# Patient Record
Sex: FEMALE | Race: BLACK OR AFRICAN AMERICAN | NOT HISPANIC OR LATINO | Employment: FULL TIME | URBAN - METROPOLITAN AREA
[De-identification: names, ages, dates, MRNs, and addresses within clinical notes are randomized per-mention and may not be internally consistent; named-entity substitution may affect disease eponyms.]

---

## 2017-04-12 ENCOUNTER — HISTORICAL (OUTPATIENT)
Dept: RADIOLOGY | Facility: HOSPITAL | Age: 52
End: 2017-04-12

## 2018-01-11 ENCOUNTER — HISTORICAL (OUTPATIENT)
Dept: RADIOLOGY | Facility: HOSPITAL | Age: 53
End: 2018-01-11

## 2018-01-11 LAB
ABS NEUT (OLG): 2.04 X10(3)/MCL (ref 2.1–9.2)
ALBUMIN SERPL-MCNC: 4 GM/DL (ref 3.4–5)
ALBUMIN/GLOB SERPL: 1.1 RATIO (ref 1.1–2)
ALP SERPL-CCNC: 130 UNIT/L (ref 46–116)
ALT SERPL-CCNC: 24 UNIT/L (ref 12–78)
APPEARANCE, UA: CLEAR
AST SERPL-CCNC: 11 UNIT/L (ref 15–37)
BACTERIA #/AREA URNS AUTO: ABNORMAL /HPF
BILIRUB SERPL-MCNC: 0.1 MG/DL (ref 0.2–1)
BILIRUB UR QL STRIP: NEGATIVE
BILIRUBIN DIRECT+TOT PNL SERPL-MCNC: 0.06 MG/DL (ref 0–0.2)
BILIRUBIN DIRECT+TOT PNL SERPL-MCNC: 0.08 MG/DL (ref 0–0.8)
BUN SERPL-MCNC: 7.9 MG/DL (ref 7–18)
CALCIUM SERPL-MCNC: 10.3 MG/DL (ref 8.5–10.1)
CHLORIDE SERPL-SCNC: 102 MMOL/L (ref 98–107)
CHOLEST SERPL-MCNC: 241 MG/DL (ref 0–200)
CHOLEST/HDLC SERPL: 4.4 {RATIO} (ref 0–4)
CO2 SERPL-SCNC: 29.4 MMOL/L (ref 21–32)
COLOR UR: YELLOW
CREAT SERPL-MCNC: 0.62 MG/DL (ref 0.6–1.3)
ERYTHROCYTE [DISTWIDTH] IN BLOOD BY AUTOMATED COUNT: 14.3 % (ref 11.5–17)
GLOBULIN SER-MCNC: 3.5 GM/DL (ref 2.4–3.5)
GLUCOSE (UA): NEGATIVE
GLUCOSE SERPL-MCNC: 93 MG/DL (ref 74–106)
HCT VFR BLD AUTO: 38.7 % (ref 37–47)
HDLC SERPL-MCNC: 55 MG/DL (ref 40–60)
HGB BLD-MCNC: 12.5 GM/DL (ref 12–16)
HGB UR QL STRIP: NEGATIVE
KETONES UR QL STRIP: NEGATIVE
LDLC SERPL CALC-MCNC: 165 MG/DL (ref 0–129)
LEUKOCYTE ESTERASE UR QL STRIP: NEGATIVE
MCH RBC QN AUTO: 27.6 PG (ref 27–31)
MCHC RBC AUTO-ENTMCNC: 32.3 GM/DL (ref 33–36)
MCV RBC AUTO: 85.4 FL (ref 80–94)
NITRITE UR QL STRIP.AUTO: NEGATIVE
PH UR STRIP: 6.5 [PH] (ref 5–9)
PLATELET # BLD AUTO: 306 X10(3)/MCL (ref 130–400)
PMV BLD AUTO: 11.9 FL (ref 9.4–12.4)
POTASSIUM SERPL-SCNC: 3.9 MMOL/L (ref 3.5–5.1)
PROT SERPL-MCNC: 7.5 GM/DL (ref 6.4–8.2)
PROT UR QL STRIP: NEGATIVE
RBC # BLD AUTO: 4.53 X10(6)/MCL (ref 4.2–5.4)
RBC #/AREA URNS HPF: ABNORMAL /[HPF]
SODIUM SERPL-SCNC: 138 MMOL/L (ref 136–145)
SP GR UR STRIP: 1.01 (ref 1–1.03)
SQUAMOUS EPITHELIAL, UA: ABNORMAL
TRIGL SERPL-MCNC: 103 MG/DL
TSH SERPL-ACNC: 0.37 MIU/ML (ref 0.36–3.74)
URATE SERPL-MCNC: 3.8 MG/DL (ref 2.6–7.2)
UROBILINOGEN UR STRIP-ACNC: 0.2
VLDLC SERPL CALC-MCNC: 21 MG/DL
WBC # SPEC AUTO: 5.1 X10(3)/MCL (ref 4.5–11.5)
WBC #/AREA URNS AUTO: ABNORMAL /[HPF]

## 2018-04-09 LAB
PAP RECOMMENDATION EXT: NORMAL
PAP SMEAR: NORMAL

## 2018-09-11 ENCOUNTER — HISTORICAL (OUTPATIENT)
Dept: LAB | Facility: HOSPITAL | Age: 53
End: 2018-09-11

## 2018-09-11 LAB
ALBUMIN SERPL-MCNC: 3.6 GM/DL (ref 3.4–5)
ALP SERPL-CCNC: 111 UNIT/L (ref 46–116)
ALT SERPL-CCNC: 15 UNIT/L (ref 12–78)
AST SERPL-CCNC: 13 UNIT/L (ref 15–37)
BILIRUB SERPL-MCNC: 0.2 MG/DL (ref 0.2–1)
BILIRUBIN DIRECT+TOT PNL SERPL-MCNC: 0.1 MG/DL (ref 0–0.2)
BILIRUBIN DIRECT+TOT PNL SERPL-MCNC: 0.1 MG/DL (ref 0–0.8)
CHOLEST SERPL-MCNC: 167 MG/DL (ref 0–200)
CHOLEST/HDLC SERPL: 2.7 {RATIO} (ref 0–4)
HDLC SERPL-MCNC: 61 MG/DL (ref 40–60)
LDLC SERPL CALC-MCNC: 90 MG/DL (ref 0–129)
PROT SERPL-MCNC: 6.9 GM/DL (ref 6.4–8.2)
TRIGL SERPL-MCNC: 81 MG/DL
VLDLC SERPL CALC-MCNC: 16 MG/DL

## 2019-01-25 ENCOUNTER — HISTORICAL (OUTPATIENT)
Dept: RADIOLOGY | Facility: HOSPITAL | Age: 54
End: 2019-01-25

## 2019-11-20 ENCOUNTER — HISTORICAL (OUTPATIENT)
Dept: LAB | Facility: HOSPITAL | Age: 54
End: 2019-11-20

## 2019-11-20 ENCOUNTER — HISTORICAL (OUTPATIENT)
Dept: RADIOLOGY | Facility: HOSPITAL | Age: 54
End: 2019-11-20

## 2019-11-20 LAB
ALBUMIN SERPL-MCNC: 3.5 GM/DL (ref 3.4–5)
ALBUMIN/GLOB SERPL: 0.8 RATIO (ref 1.1–2)
ALP SERPL-CCNC: 100 UNIT/L (ref 46–116)
ALT SERPL-CCNC: 13 UNIT/L (ref 12–78)
APPEARANCE, UA: CLEAR
AST SERPL-CCNC: 14 UNIT/L (ref 15–37)
BACTERIA SPEC CULT: NORMAL
BILIRUB SERPL-MCNC: 0.2 MG/DL (ref 0.2–1)
BILIRUB UR QL STRIP: NEGATIVE
BILIRUBIN DIRECT+TOT PNL SERPL-MCNC: 0.09 MG/DL (ref 0–0.8)
BILIRUBIN DIRECT+TOT PNL SERPL-MCNC: 0.11 MG/DL (ref 0–0.2)
BUN SERPL-MCNC: 4.1 MG/DL (ref 7–18)
CALCIUM SERPL-MCNC: 9.3 MG/DL (ref 8.5–10.1)
CHLORIDE SERPL-SCNC: 103 MMOL/L (ref 98–107)
CHOLEST SERPL-MCNC: 229 MG/DL (ref 0–200)
CHOLEST/HDLC SERPL: 2.9 {RATIO} (ref 0–4)
CO2 SERPL-SCNC: 23.5 MMOL/L (ref 21–32)
COLOR UR: YELLOW
CREAT SERPL-MCNC: 0.79 MG/DL (ref 0.6–1.3)
ERYTHROCYTE [DISTWIDTH] IN BLOOD BY AUTOMATED COUNT: 14.8 % (ref 11.5–17)
GLOBULIN SER-MCNC: 4.2 GM/DL (ref 2.4–3.5)
GLUCOSE (UA): NEGATIVE
GLUCOSE SERPL-MCNC: 88 MG/DL (ref 74–106)
HCT VFR BLD AUTO: 37.8 % (ref 37–47)
HDLC SERPL-MCNC: 79 MG/DL (ref 40–60)
HGB BLD-MCNC: 12.3 GM/DL (ref 12–16)
HGB UR QL STRIP: NORMAL
KETONES UR QL STRIP: NEGATIVE
LDLC SERPL CALC-MCNC: 130 MG/DL (ref 0–129)
LEUKOCYTE ESTERASE UR QL STRIP: NEGATIVE
MCH RBC QN AUTO: 28.5 PG (ref 27–31)
MCHC RBC AUTO-ENTMCNC: 32.5 GM/DL (ref 33–36)
MCV RBC AUTO: 87.5 FL (ref 80–94)
NITRITE UR QL STRIP: NEGATIVE
PH UR STRIP: 6 [PH] (ref 5–9)
PLATELET # BLD AUTO: 311 X10(3)/MCL (ref 130–400)
PMV BLD AUTO: 11.3 FL (ref 9.4–12.4)
POTASSIUM SERPL-SCNC: 3.6 MMOL/L (ref 3.5–5.1)
PROT SERPL-MCNC: 7.7 GM/DL (ref 6.4–8.2)
PROT UR QL STRIP: NEGATIVE
RBC # BLD AUTO: 4.32 X10(6)/MCL (ref 4.2–5.4)
RBC #/AREA URNS HPF: NORMAL /[HPF]
SODIUM SERPL-SCNC: 137 MMOL/L (ref 136–145)
SP GR UR STRIP: >=1.03 (ref 1–1.03)
SQUAMOUS EPITHELIAL, UA: NORMAL
TRIGL SERPL-MCNC: 98 MG/DL
TSH SERPL-ACNC: 0.81 MIU/ML (ref 0.36–3.74)
UROBILINOGEN UR STRIP-ACNC: 1
VLDLC SERPL CALC-MCNC: 20 MG/DL
WBC # SPEC AUTO: 4.3 X10(3)/MCL (ref 4.5–11.5)
WBC #/AREA URNS HPF: NORMAL /[HPF]

## 2021-01-07 ENCOUNTER — HISTORICAL (OUTPATIENT)
Dept: RADIOLOGY | Facility: HOSPITAL | Age: 56
End: 2021-01-07

## 2021-01-07 LAB
ALBUMIN SERPL-MCNC: 3.7 GM/DL (ref 3.5–5)
ALBUMIN/GLOB SERPL: 1.2 RATIO (ref 1.1–2)
ALP SERPL-CCNC: 95 UNIT/L (ref 40–150)
ALT SERPL-CCNC: 33 UNIT/L (ref 0–55)
APPEARANCE, UA: CLEAR
AST SERPL-CCNC: 45 UNIT/L (ref 5–34)
BACTERIA #/AREA URNS AUTO: NORMAL /HPF
BILIRUB SERPL-MCNC: 0.3 MG/DL
BILIRUB UR QL STRIP: NORMAL
BILIRUBIN DIRECT+TOT PNL SERPL-MCNC: 0.1 MG/DL (ref 0–0.5)
BILIRUBIN DIRECT+TOT PNL SERPL-MCNC: 0.2 MG/DL (ref 0–0.8)
BUN SERPL-MCNC: 7.2 MG/DL (ref 9.8–20.1)
CALCIUM SERPL-MCNC: 8.9 MG/DL (ref 8.4–10.2)
CHLORIDE SERPL-SCNC: 109 MMOL/L (ref 98–107)
CHOLEST SERPL-MCNC: 225 MG/DL
CHOLEST/HDLC SERPL: 4 {RATIO} (ref 0–5)
CO2 SERPL-SCNC: 22 MMOL/L (ref 22–29)
COLOR UR: YELLOW
CREAT SERPL-MCNC: 0.7 MG/DL (ref 0.55–1.02)
ERYTHROCYTE [DISTWIDTH] IN BLOOD BY AUTOMATED COUNT: 15.1 % (ref 11.5–17)
GLOBULIN SER-MCNC: 3.2 GM/DL (ref 2.4–3.5)
GLUCOSE (UA): NEGATIVE
GLUCOSE SERPL-MCNC: 89 MG/DL (ref 74–100)
HCT VFR BLD AUTO: 39.7 % (ref 37–47)
HDLC SERPL-MCNC: 59 MG/DL (ref 35–60)
HGB BLD-MCNC: 12.5 GM/DL (ref 12–16)
HGB UR QL STRIP: NEGATIVE
KETONES UR QL STRIP: NEGATIVE
LDLC SERPL CALC-MCNC: 141 MG/DL (ref 50–140)
LEUKOCYTE ESTERASE UR QL STRIP: NEGATIVE
MCH RBC QN AUTO: 27.7 PG (ref 27–31)
MCHC RBC AUTO-ENTMCNC: 31.5 GM/DL (ref 33–36)
MCV RBC AUTO: 88 FL (ref 80–94)
NITRITE UR QL STRIP.AUTO: NEGATIVE
PH UR STRIP: 6 [PH] (ref 5–9)
PLATELET # BLD AUTO: 336 X10(3)/MCL (ref 130–400)
PMV BLD AUTO: 11.9 FL (ref 9.4–12.4)
POTASSIUM SERPL-SCNC: 4.1 MMOL/L (ref 3.5–5.1)
PROT SERPL-MCNC: 6.9 GM/DL (ref 6.4–8.3)
PROT UR QL STRIP: 30
RBC # BLD AUTO: 4.51 X10(6)/MCL (ref 4.2–5.4)
RBC #/AREA URNS HPF: NORMAL /[HPF]
SODIUM SERPL-SCNC: 140 MMOL/L (ref 136–145)
SP GR UR STRIP: >=1.03 (ref 1–1.03)
SQUAMOUS EPITHELIAL, UA: NORMAL
TRIGL SERPL-MCNC: 126 MG/DL (ref 37–140)
TSH SERPL-ACNC: 1.02 UIU/ML (ref 0.35–4.94)
UROBILINOGEN UR STRIP-ACNC: 1
VLDLC SERPL CALC-MCNC: 25 MG/DL
WBC # SPEC AUTO: 4.8 X10(3)/MCL (ref 4.5–11.5)
WBC #/AREA URNS AUTO: NORMAL /HPF

## 2021-01-27 ENCOUNTER — HISTORICAL (OUTPATIENT)
Dept: RADIOLOGY | Facility: HOSPITAL | Age: 56
End: 2021-01-27

## 2021-06-01 ENCOUNTER — HISTORICAL (OUTPATIENT)
Dept: LAB | Facility: HOSPITAL | Age: 56
End: 2021-06-01

## 2021-06-01 LAB
ALBUMIN SERPL-MCNC: 3.5 GM/DL (ref 3.5–5)
ALBUMIN/GLOB SERPL: 0.9 RATIO (ref 1.1–2)
ALP SERPL-CCNC: 102 UNIT/L (ref 40–150)
ALT SERPL-CCNC: 24 UNIT/L (ref 0–55)
ANTINUCLEAR ANTIBODY SCREEN (OHS): NEGATIVE
APPEARANCE, UA: CLEAR
AST SERPL-CCNC: 43 UNIT/L (ref 5–34)
BACTERIA SPEC CULT: NORMAL
BILIRUB SERPL-MCNC: 0.2 MG/DL
BILIRUB UR QL STRIP: NEGATIVE
BILIRUBIN DIRECT+TOT PNL SERPL-MCNC: 0.1 MG/DL (ref 0–0.5)
BILIRUBIN DIRECT+TOT PNL SERPL-MCNC: 0.1 MG/DL (ref 0–0.8)
BUN SERPL-MCNC: 7.1 MG/DL (ref 9.8–20.1)
CALCIUM SERPL-MCNC: 9.3 MG/DL (ref 8.4–10.2)
CHLORIDE SERPL-SCNC: 108 MMOL/L (ref 98–107)
CO2 SERPL-SCNC: 24 MMOL/L (ref 22–29)
COLOR UR: YELLOW
CREAT SERPL-MCNC: 0.63 MG/DL (ref 0.55–1.02)
DSDNA ANTIBODY (OHS): NEGATIVE
ERYTHROCYTE [DISTWIDTH] IN BLOOD BY AUTOMATED COUNT: 16.8 % (ref 11.5–17)
ERYTHROCYTE [SEDIMENTATION RATE] IN BLOOD: 22 MM/HR (ref 0–20)
EST. AVERAGE GLUCOSE BLD GHB EST-MCNC: 108.3 MG/DL
GLOBULIN SER-MCNC: 3.7 GM/DL (ref 2.4–3.5)
GLUCOSE (UA): NEGATIVE
GLUCOSE SERPL-MCNC: 93 MG/DL (ref 74–100)
HBA1C MFR BLD: 5.4 %
HCT VFR BLD AUTO: 42.6 % (ref 37–47)
HGB BLD-MCNC: 13.2 GM/DL (ref 12–16)
HGB UR QL STRIP: NORMAL
KETONES UR QL STRIP: NEGATIVE
LEUKOCYTE ESTERASE UR QL STRIP: NEGATIVE
MCH RBC QN AUTO: 27.7 PG (ref 27–31)
MCHC RBC AUTO-ENTMCNC: 31 GM/DL (ref 33–36)
MCV RBC AUTO: 89.3 FL (ref 80–94)
NITRITE UR QL STRIP: NEGATIVE
PH UR STRIP: 6 [PH] (ref 5–9)
PLATELET # BLD AUTO: 272 X10(3)/MCL (ref 130–400)
PMV BLD AUTO: 11.4 FL (ref 9.4–12.4)
POTASSIUM SERPL-SCNC: 4.1 MMOL/L (ref 3.5–5.1)
PROT SERPL-MCNC: 7.2 GM/DL (ref 6.4–8.3)
PROT UR QL STRIP: NEGATIVE
RBC # BLD AUTO: 4.77 X10(6)/MCL (ref 4.2–5.4)
RBC #/AREA URNS HPF: NORMAL /HPF
SODIUM SERPL-SCNC: 143 MMOL/L (ref 136–145)
SP GR UR STRIP: 1.02 (ref 1–1.03)
SQUAMOUS EPITHELIAL, UA: NORMAL
UROBILINOGEN UR STRIP-ACNC: 0.2
VIT B12 SERPL-MCNC: 247 PG/ML (ref 213–816)
WBC # SPEC AUTO: 5.2 X10(3)/MCL (ref 4.5–11.5)
WBC #/AREA URNS HPF: NORMAL /[HPF]

## 2021-08-04 ENCOUNTER — HISTORICAL (OUTPATIENT)
Dept: LAB | Facility: HOSPITAL | Age: 56
End: 2021-08-04

## 2021-08-04 LAB
ALBUMIN % SPEP (OHS): 47.24 % (ref 48.1–59.5)
ALBUMIN SERPL-MCNC: 3.5 GM/DL (ref 3.5–5)
ALBUMIN/GLOB SERPL: 1 RATIO (ref 1.1–2)
ALPHA 1 GLOB (OHS): 0.3 GM/DL (ref 0–0.4)
ALPHA 1 GLOB% (OHS): 4.4 % (ref 2.3–4.9)
ALPHA 2 GLOB % (OHS): 11.51 % (ref 6.9–13)
ALPHA 2 GLOB (OHS): 0.79 GM/DL (ref 0.4–1)
BETA GLOB% (OHS): 18.13 % (ref 13.8–19.7)
GAMMA GLOBULIN % (OHS): 18.73 % (ref 10.1–21.9)
GAMMA GLOBULIN (OHS): 1.29 GM/DL (ref 0.4–1.8)
GLOBULIN SER-MCNC: 3.4 GM/DL (ref 2.4–3.5)
M SPIKE % (OHS): ABNORMAL
M SPIKE (OHS): ABNORMAL
PROT SERPL-MCNC: 6.9 GM/DL (ref 6.4–8.3)
PROT SERPL-MCNC: 7 GM/DL
SPE INTERPRETATION (OHS): ABNORMAL

## 2021-09-14 ENCOUNTER — HISTORICAL (OUTPATIENT)
Dept: ADMINISTRATIVE | Facility: HOSPITAL | Age: 56
End: 2021-09-14

## 2021-09-14 LAB
ABS NEUT (OLG): 3.54 X10(3)/MCL (ref 2.1–9.2)
ALBUMIN % SPEP (OHS): 50.09 % (ref 48.1–59.5)
ALBUMIN SERPL-MCNC: 3.8 GM/DL (ref 3.5–5)
ALBUMIN SERPL-MCNC: 3.9 GM/DL (ref 3.5–5)
ALBUMIN/GLOB SERPL: 1.1 RATIO (ref 1.1–2)
ALBUMIN/GLOB SERPL: 1.1 RATIO (ref 1.1–2)
ALP SERPL-CCNC: 120 UNIT/L (ref 40–150)
ALPHA 1 GLOB (OHS): 0.32 GM/DL (ref 0–0.4)
ALPHA 1 GLOB% (OHS): 4.38 % (ref 2.3–4.9)
ALPHA 2 GLOB % (OHS): 12.07 % (ref 6.9–13)
ALPHA 2 GLOB (OHS): 0.89 GM/DL (ref 0.4–1)
ALT SERPL-CCNC: 14 UNIT/L (ref 0–55)
ANTINUCLEAR ANTIBODY SCREEN (OHS): NEGATIVE
AST SERPL-CCNC: 17 UNIT/L (ref 5–34)
B2 MICROGLOB SERPL-MCNC: 2.18 MG/L
BASOPHILS # BLD AUTO: 0 X10(3)/MCL (ref 0–0.2)
BASOPHILS # BLD AUTO: 0 X10(3)/MCL (ref 0–0.2)
BASOPHILS NFR BLD AUTO: 0.3 %
BETA GLOB (OHS): 1.28 GM/DL (ref 0.7–1.3)
BETA GLOB% (OHS): 17.34 % (ref 13.8–19.7)
BILIRUB SERPL-MCNC: 0.2 MG/DL
BILIRUBIN DIRECT+TOT PNL SERPL-MCNC: 0.1 MG/DL (ref 0–0.5)
BILIRUBIN DIRECT+TOT PNL SERPL-MCNC: 0.1 MG/DL (ref 0–0.8)
BUN SERPL-MCNC: 8.9 MG/DL (ref 9.8–20.1)
CALCIUM SERPL-MCNC: 9.9 MG/DL (ref 8.4–10.2)
CENTROMERE PROTEIN ANTIBODY (OHS): NEGATIVE
CHLORIDE SERPL-SCNC: 100 MMOL/L (ref 98–107)
CO2 SERPL-SCNC: 29 MMOL/L (ref 22–29)
CREAT SERPL-MCNC: 0.79 MG/DL (ref 0.55–1.02)
DSDNA ANTIBODY (OHS): NEGATIVE
EOSINOPHIL # BLD AUTO: 0 X10(3)/MCL (ref 0–0.9)
EOSINOPHIL # BLD AUTO: 0.1 X10(3)/MCL (ref 0–0.9)
EOSINOPHIL NFR BLD AUTO: 0.9 %
ERYTHROCYTE [DISTWIDTH] IN BLOOD BY AUTOMATED COUNT: 14.5 % (ref 11.5–17)
FERRITIN SERPL-MCNC: 124.73 NG/ML (ref 4.63–204)
FOLATE SERPL-MCNC: 117.2 NG/ML (ref 7–31.4)
GAMMA GLOBULIN % (OHS): 16.12 % (ref 10.1–21.9)
GAMMA GLOBULIN (OHS): 1.19 GM/DL (ref 0.4–1.8)
GLOBULIN SER-MCNC: 3.5 GM/DL (ref 2.4–3.5)
GLOBULIN SER-MCNC: 3.6 GM/DL (ref 2.4–3.5)
GLUCOSE SERPL-MCNC: 84 MG/DL (ref 74–100)
GROUP & RH: NORMAL
HCT VFR BLD AUTO: 40.8 % (ref 37–47)
HGB BLD-MCNC: 12.9 GM/DL (ref 12–16)
IFE INTERPRETATION (OHS): ABNORMAL
IGA SERPL-MCNC: 109 MG/DL (ref 65–421)
IGA SERPL-MCNC: 109 MG/DL (ref 65–421)
IGG SERPL-MCNC: 1139 MG/DL (ref 552–1632)
IGG SERPL-MCNC: 1192 MG/DL (ref 552–1632)
IGM SERPL-MCNC: 34 MG/DL (ref 33–293)
IRON SATN MFR SERPL: 19 % (ref 20–50)
IRON SERPL-MCNC: 54 UG/DL (ref 50–170)
JO-1 ANTIBODY (OHS): NEGATIVE
LDH SERPL-CCNC: 207 UNIT/L (ref 140–271)
LYMPHOCYTES # BLD AUTO: 2.9 X10(3)/MCL (ref 0.6–4.6)
LYMPHOCYTES # BLD AUTO: 2.9 X10(3)/MCL (ref 0.6–4.6)
LYMPHOCYTES NFR BLD AUTO: 42.2 %
M SPIKE % (OHS): ABNORMAL
M SPIKE (OHS): ABNORMAL
MCH RBC QN AUTO: 28.4 PG (ref 27–31)
MCHC RBC AUTO-ENTMCNC: 31.6 GM/DL (ref 33–36)
MCV RBC AUTO: 89.9 FL (ref 80–94)
MONOCYTES # BLD AUTO: 0.4 X10(3)/MCL (ref 0.1–1.3)
MONOCYTES # BLD AUTO: 0.4 X10(3)/MCL (ref 0.1–1.3)
MONOCYTES NFR BLD AUTO: 5.1 %
NEUTROPHILS # BLD AUTO: 3.48 X10(3)/MCL (ref 2.1–9.2)
NEUTROPHILS # BLD AUTO: 3.5 X10(3)/MCL (ref 2.1–9.2)
NEUTROPHILS NFR BLD AUTO: 51.4 %
PEP GRAPH (OHS): ABNORMAL
PLATELET # BLD AUTO: 317 X10(3)/MCL (ref 130–400)
PMV BLD AUTO: 10.9 FL (ref 9.4–12.4)
POTASSIUM SERPL-SCNC: 4.2 MMOL/L (ref 3.5–5.1)
PROT SERPL-MCNC: 7.4 GM/DL (ref 6.4–8.3)
PROT SERPL-MCNC: 7.4 GM/DL (ref 6.4–8.3)
RBC # BLD AUTO: 4.54 X10(6)/MCL (ref 4.2–5.4)
RHEUMATOID FACT SERPL-ACNC: <13 IU/ML
RNP70 ANTIBODY (OHS): NEGATIVE
SCLERODERMA (SCL-70S) ANTIBODY (OHS): NEGATIVE
SMITH DP IGG (OHS): NEGATIVE
SODIUM SERPL-SCNC: 140 MMOL/L (ref 136–145)
SSA(RO) ANTIBODY (OHS): NEGATIVE
SSB(LA) ANTIBODY (OHS): NEGATIVE
TIBC SERPL-MCNC: 229 UG/DL (ref 70–310)
TIBC SERPL-MCNC: 283 UG/DL (ref 250–450)
TRANSFERRIN SERPL-MCNC: 259 MG/DL (ref 180–382)
TSH SERPL-ACNC: 0.57 UIU/ML (ref 0.35–4.94)
U1RNP ANTIBODY (OHS): NEGATIVE
VIT B12 SERPL-MCNC: 865 PG/ML (ref 213–816)
WBC # SPEC AUTO: 6.9 X10(3)/MCL (ref 4.5–11.5)

## 2021-09-16 ENCOUNTER — HISTORICAL (OUTPATIENT)
Dept: HEMATOLOGY/ONCOLOGY | Facility: CLINIC | Age: 56
End: 2021-09-16

## 2021-09-20 ENCOUNTER — HISTORICAL (OUTPATIENT)
Dept: RADIOLOGY | Facility: HOSPITAL | Age: 56
End: 2021-09-20

## 2021-10-04 ENCOUNTER — HISTORICAL (OUTPATIENT)
Dept: ADMINISTRATIVE | Facility: HOSPITAL | Age: 56
End: 2021-10-04

## 2022-04-01 ENCOUNTER — HISTORICAL (OUTPATIENT)
Dept: ADMINISTRATIVE | Facility: HOSPITAL | Age: 57
End: 2022-04-01

## 2022-04-01 ENCOUNTER — HISTORICAL (OUTPATIENT)
Dept: HEMATOLOGY/ONCOLOGY | Facility: CLINIC | Age: 57
End: 2022-04-01

## 2022-04-01 LAB
ABS NEUT (OLG): 2.88 (ref 2.1–9.2)
ALBUMIN SERPL-MCNC: 3.6 G/DL (ref 3.5–5)
ALBUMIN/GLOB SERPL: 1.1 {RATIO} (ref 1.1–2)
ALP SERPL-CCNC: 98 U/L (ref 40–150)
ALT SERPL-CCNC: 9 U/L (ref 0–55)
AST SERPL-CCNC: 12 U/L (ref 5–34)
BASOPHILS # BLD AUTO: 0 10*3/UL (ref 0–0.2)
BASOPHILS NFR BLD AUTO: 0.5 %
BILIRUB SERPL-MCNC: 0.2 MG/DL
BILIRUBIN DIRECT+TOT PNL SERPL-MCNC: <0.1 (ref 0–0.5)
BILIRUBIN DIRECT+TOT PNL SERPL-MCNC: >0.1 (ref 0–0.8)
BUN SERPL-MCNC: 7.3 MG/DL (ref 9.8–20.1)
CALCIUM SERPL-MCNC: 9.6 MG/DL (ref 8.7–10.5)
CHLORIDE SERPL-SCNC: 104 MMOL/L (ref 98–107)
CO2 SERPL-SCNC: 28 MMOL/L (ref 22–29)
CREAT SERPL-MCNC: 0.75 MG/DL (ref 0.55–1.02)
EOSINOPHIL # BLD AUTO: 0 10*3/UL (ref 0–0.9)
EOSINOPHIL NFR BLD AUTO: 0.8 %
ERYTHROCYTE [DISTWIDTH] IN BLOOD BY AUTOMATED COUNT: 15.6 % (ref 11.5–17)
GLOBULIN SER-MCNC: 3.3 G/DL (ref 2.4–3.5)
GLUCOSE SERPL-MCNC: 104 MG/DL (ref 74–100)
HCT VFR BLD AUTO: 39.5 % (ref 37–47)
HEMOLYSIS INTERF INDEX SERPL-ACNC: <0
HGB BLD-MCNC: 12.6 G/DL (ref 12–16)
ICTERIC INTERF INDEX SERPL-ACNC: 0
LIPEMIC INTERF INDEX SERPL-ACNC: <0
LYMPHOCYTES # BLD AUTO: 2.6 10*3/UL (ref 0.6–4.6)
LYMPHOCYTES NFR BLD AUTO: 42.7 %
MANUAL DIFF? (OHS): NO
MCH RBC QN AUTO: 27.5 PG (ref 27–31)
MCHC RBC AUTO-ENTMCNC: 31.9 G/DL (ref 33–36)
MCV RBC AUTO: 86.2 FL (ref 80–94)
MONOCYTES # BLD AUTO: 0.5 10*3/UL (ref 0.1–1.3)
MONOCYTES NFR BLD AUTO: 7.7 %
NEUTROPHILS # BLD AUTO: 2.9 10*3/UL (ref 2.1–9.2)
NEUTROPHILS NFR BLD AUTO: 48.3 %
PLATELET # BLD AUTO: 279 10*3/UL (ref 130–400)
PMV BLD AUTO: 11.5 FL (ref 9.4–12.4)
POTASSIUM SERPL-SCNC: 4 MMOL/L (ref 3.5–5.1)
PROT SERPL-MCNC: 6.9 G/DL (ref 6.4–8.3)
RBC # BLD AUTO: 4.58 10*6/UL (ref 4.2–5.4)
SODIUM SERPL-SCNC: 139 MMOL/L (ref 136–145)
WBC # SPEC AUTO: 6 10*3/UL (ref 4.5–11.5)

## 2022-04-10 ENCOUNTER — HISTORICAL (OUTPATIENT)
Dept: ADMINISTRATIVE | Facility: HOSPITAL | Age: 57
End: 2022-04-10
Payer: COMMERCIAL

## 2022-04-20 ENCOUNTER — HISTORICAL (OUTPATIENT)
Dept: RADIOLOGY | Facility: HOSPITAL | Age: 57
End: 2022-04-20
Payer: COMMERCIAL

## 2022-04-20 ENCOUNTER — HISTORICAL (OUTPATIENT)
Dept: ADMINISTRATIVE | Facility: HOSPITAL | Age: 57
End: 2022-04-20
Payer: COMMERCIAL

## 2022-04-26 VITALS — HEIGHT: 63 IN | BODY MASS INDEX: 26.64 KG/M2 | WEIGHT: 150.38 LBS

## 2022-05-04 RX ORDER — MELOXICAM 7.5 MG/1
1 TABLET ORAL DAILY
COMMUNITY
Start: 2021-09-14

## 2022-05-04 RX ORDER — MULTIVITAMIN/IRON/FOLIC ACID 18MG-0.4MG
1 TABLET ORAL DAILY
COMMUNITY
Start: 2021-09-14

## 2022-05-04 RX ORDER — DULOXETIN HYDROCHLORIDE 60 MG/1
1 CAPSULE, DELAYED RELEASE ORAL DAILY
COMMUNITY
Start: 2021-09-14

## 2022-05-04 RX ORDER — TRAMADOL HYDROCHLORIDE 50 MG/1
1 TABLET ORAL EVERY 6 HOURS
COMMUNITY
Start: 2021-09-14

## 2022-05-04 RX ORDER — GABAPENTIN 600 MG/1
1 TABLET ORAL 3 TIMES DAILY
COMMUNITY
Start: 2021-09-14

## 2022-05-05 ENCOUNTER — TELEPHONE (OUTPATIENT)
Dept: HEMATOLOGY/ONCOLOGY | Facility: CLINIC | Age: 57
End: 2022-05-05
Payer: COMMERCIAL

## 2022-05-05 NOTE — TELEPHONE ENCOUNTER
Patient had appt schedule for a Virtual Visit on 5/5/22 with Dr Royal.  Pt was unable to be reached on numbers on file.    Patient is a no show per DR Royal.

## 2022-05-11 ENCOUNTER — OFFICE VISIT (OUTPATIENT)
Dept: HEMATOLOGY/ONCOLOGY | Facility: CLINIC | Age: 57
End: 2022-05-11
Payer: COMMERCIAL

## 2022-05-11 DIAGNOSIS — D47.2 MGUS (MONOCLONAL GAMMOPATHY OF UNKNOWN SIGNIFICANCE): ICD-10-CM

## 2022-05-11 DIAGNOSIS — R22.1 MASS IN NECK: ICD-10-CM

## 2022-05-11 DIAGNOSIS — M25.512 LEFT SHOULDER PAIN, UNSPECIFIED CHRONICITY: Primary | ICD-10-CM

## 2022-05-11 PROCEDURE — 1159F PR MEDICATION LIST DOCUMENTED IN MEDICAL RECORD: ICD-10-PCS | Mod: CPTII,95,, | Performed by: INTERNAL MEDICINE

## 2022-05-11 PROCEDURE — 1160F RVW MEDS BY RX/DR IN RCRD: CPT | Mod: CPTII,95,, | Performed by: INTERNAL MEDICINE

## 2022-05-11 PROCEDURE — 99443 PR PHYSICIAN TELEPHONE EVALUATION 21-30 MIN: CPT | Mod: 95,,, | Performed by: INTERNAL MEDICINE

## 2022-05-11 PROCEDURE — 1160F PR REVIEW ALL MEDS BY PRESCRIBER/CLIN PHARMACIST DOCUMENTED: ICD-10-PCS | Mod: CPTII,95,, | Performed by: INTERNAL MEDICINE

## 2022-05-11 PROCEDURE — 99443 PR PHYSICIAN TELEPHONE EVALUATION 21-30 MIN: ICD-10-PCS | Mod: 95,,, | Performed by: INTERNAL MEDICINE

## 2022-05-11 PROCEDURE — 1159F MED LIST DOCD IN RCRD: CPT | Mod: CPTII,95,, | Performed by: INTERNAL MEDICINE

## 2022-05-11 NOTE — PROGRESS NOTES
"Established Patient - Audio Only Telehealth Visit     The patient location is: home  The chief complaint leading to consultation is: discuss results  Visit type: Virtual visit with audio only (telephone)  Total time spent with patient: 35 min       The reason for the audio only service rather than synchronous audio and video virtual visit was related to technical difficulties or patient preference/necessity.     Each patient to whom I provide medical services by telemedicine is:  (1) informed of the relationship between the physician and patient and the respective role of any other health care provider with respect to management of the patient; and (2) notified that they may decline to receive medical services by telemedicine and may withdraw from such care at any time. Patient verbally consented to receive this service via voice-only telephone call.    Initial Consultation: 9/14/2021  Referring Physician: Dr Grande  Other Physicians:  Code Status: Not addressed    Diagnosis/Problem list:   Monoclonal gammopathy       HPI:   56 years old female kindly referred by Dr. Grande for evaluation of gammopathy.  Patient reports that she has "bad neuropathy" since April, after her last Covid "shot". Since then the neuropathy has worsen.  Blood work showed a monoclonal gammopathy with an M spike 0.2 back in June 2021.     No family h/o of multiple myeloma, leukemia blood malignancies.  No exposed to any chemicals that she is aware of. She is disable due to ankle injury were she has some hardware. She has been smoking since 16 years old. Used to smoke 1 ppd but since April 5 cigarettes/day     Skeletal survey 9/20/2021:Numerous radiographs of the axial and appendicular skeleton are obtained as part of a skeletal survey. There is a 4 mm lucent lesion of the proximal left humeral metadiaphysis. Otherwise no suspicious osseous lesion is seen. Mild degenerative changes of the axial and appendicular skeleton.  Impression: Indeterminate " subcentimeter lucent lesion of the proximal left humerus. No other suspicious osseous lesion.  CT Head 10/4/2021:Moderate chronic microvascular ischemic changes in the brain. Mild left mastoid air cell fluid, which can be seen in the setting of mastoiditis. Otherwise unremarkable  CT C/A/P 10/4/2021: Unremarkable contrast enhanced CT of the thorax. No bone lesion appreciated.   Repeat skeletal survey on 4/1/22 with same unchanged left humerus lucid lesion.    She reports ongoing worsening  neuropathy in hands and feet. She has gained over 25 pounds since her last visit here in 9/2021. She says that she eats toast with jelly in the mornings and ice cream at night in addition to other high carbohydrate, high fat and high sodium meals.  She denies any fever, chills, or sweats. No chest pain or shortness of breath.     During last visit she has a small visible right neck mass (1.5 cm) firm, smooth, mobile. Says that she noticed it 2 months prior. US soft tissue neck to eval right cervical chain mass (LN? dermoid cyst?)  She was also complaining of left shoulder pain. MRI Left upper arm to eval lucent lesion on skeletal survey and shoulder pain. This were discussed with the patient in detail. Results as follows:     US Soft Tissue Head and Neck 4/20/2022  FINDINGS:  In the region of concern at the lateral right neck, there is an ovoid lesion measuring 1.1 x 1 x 0.6 cm. This lesion is probably anechoic with increased through transmission compatible with cyst. There is some layering debris. No internal color Doppler signal. IMPRESSION: Complicated cyst with internal layering debris at the area of palpable complaint.     MRI left Upper 4/20/2022  FINDINGS: An 8 mm x 8 mm x 8 mm enhancing lesion is present to the proximal humeral diaphysis and is not clearly associated with cortical thinning or periosteal reaction. A similar smaller lesion is present to the surgical neck. No discrete lesion seen to the distal humerus or the  included portion of the scapula.    The study is not optimized to evaluate the rotator cuff. An intermediate grade partial-thickness articular surface tear is present to the distal infraspinatus tendon. The labrum is not clearly visible.  No os acromiale. Coracoclavicular ligaments are intact. No atrophy of the rotator cuff muscles. No axillary lymphadenopathy. The neurovascular structures appear normal in course and caliber.  IMPRESSION: An 8 mm x 8 mm x 8 mm lesion is present to the proximal humeral diaphysis. A similar appearing smaller lesion is present to the surgical neck. These would be consistent with multiple myeloma under the appropriate clinical circumstances. Please correlate clinically as to the need for direct tissue acquisition. Intermediate grade partial-thickness articular surface tear to the distal infraspinatus tendon.    Assessment:  Monoclonal gammopathy-RESOLVED but small lucencies   Peripheral Neuropathy  Smoker since 16 years of age     Plan:   RTC in 8 weeks with MD with MM labs.     Refer to Ortho for further eval of left shoulder pain and Intermediate grade partial-thickness articular surface tear to the distal infraspinatus tendon. If need surgery will ask to do biopsy of suspicious lesions  Refer to ENT for further eval of Complicated cyst     Encouraged to call for any questions or problems.  The patient was given ample opportunity to ask questions and they were all answered to satisfaction; patient demonstrated understanding of what we discussed and is agreeable to the plan.      This service was not originating from a related E/M service provided within the previous 7 days nor will  to an E/M service or procedure within the next 24 hours or my soonest available appointment.  Prevailing standard of care was able to be met in this audio-only visit.

## 2022-05-31 DIAGNOSIS — Z12.31 ENCOUNTER FOR SCREENING MAMMOGRAM FOR MALIGNANT NEOPLASM OF BREAST: Primary | ICD-10-CM

## 2022-06-03 ENCOUNTER — HOSPITAL ENCOUNTER (OUTPATIENT)
Dept: RADIOLOGY | Facility: HOSPITAL | Age: 57
Discharge: HOME | End: 2022-06-03
Attending: FAMILY MEDICINE
Payer: COMMERCIAL

## 2022-06-03 DIAGNOSIS — Z12.31 ENCOUNTER FOR SCREENING MAMMOGRAM FOR MALIGNANT NEOPLASM OF BREAST: ICD-10-CM

## 2022-06-03 PROCEDURE — 77063 BREAST TOMOSYNTHESIS BI: CPT | Mod: 26,,, | Performed by: RADIOLOGY

## 2022-06-03 PROCEDURE — 77063 MAMMO DIGITAL SCREENING BILAT WITH TOMO: ICD-10-PCS | Mod: 26,,, | Performed by: RADIOLOGY

## 2022-06-03 PROCEDURE — 77067 SCR MAMMO BI INCL CAD: CPT | Mod: 26,,, | Performed by: RADIOLOGY

## 2022-06-03 PROCEDURE — 77067 SCR MAMMO BI INCL CAD: CPT | Mod: TC

## 2022-06-03 PROCEDURE — 77067 MAMMO DIGITAL SCREENING BILAT WITH TOMO: ICD-10-PCS | Mod: 26,,, | Performed by: RADIOLOGY

## 2022-06-14 ENCOUNTER — HOSPITAL ENCOUNTER (OUTPATIENT)
Dept: RADIOLOGY | Facility: CLINIC | Age: 57
Discharge: HOME | End: 2022-06-14
Attending: ORTHOPAEDIC SURGERY
Payer: COMMERCIAL

## 2022-06-14 ENCOUNTER — OFFICE VISIT (OUTPATIENT)
Dept: ORTHOPEDICS | Facility: CLINIC | Age: 57
End: 2022-06-14
Payer: COMMERCIAL

## 2022-06-14 VITALS — WEIGHT: 150 LBS | BODY MASS INDEX: 26.58 KG/M2 | HEIGHT: 63 IN

## 2022-06-14 DIAGNOSIS — M75.102 NONTRAUMATIC TEAR OF LEFT ROTATOR CUFF, UNSPECIFIED TEAR EXTENT: Primary | ICD-10-CM

## 2022-06-14 DIAGNOSIS — M25.512 LEFT SHOULDER PAIN, UNSPECIFIED CHRONICITY: ICD-10-CM

## 2022-06-14 DIAGNOSIS — M19.012 ARTHROSIS OF LEFT ACROMIOCLAVICULAR JOINT: ICD-10-CM

## 2022-06-14 PROCEDURE — 3008F PR BODY MASS INDEX (BMI) DOCUMENTED: ICD-10-PCS | Mod: CPTII,,, | Performed by: ORTHOPAEDIC SURGERY

## 2022-06-14 PROCEDURE — 1159F MED LIST DOCD IN RCRD: CPT | Mod: CPTII,,, | Performed by: ORTHOPAEDIC SURGERY

## 2022-06-14 PROCEDURE — 1160F RVW MEDS BY RX/DR IN RCRD: CPT | Mod: CPTII,,, | Performed by: ORTHOPAEDIC SURGERY

## 2022-06-14 PROCEDURE — 99204 PR OFFICE/OUTPT VISIT, NEW, LEVL IV, 45-59 MIN: ICD-10-PCS | Mod: ,,, | Performed by: ORTHOPAEDIC SURGERY

## 2022-06-14 PROCEDURE — 1160F PR REVIEW ALL MEDS BY PRESCRIBER/CLIN PHARMACIST DOCUMENTED: ICD-10-PCS | Mod: CPTII,,, | Performed by: ORTHOPAEDIC SURGERY

## 2022-06-14 PROCEDURE — 99204 OFFICE O/P NEW MOD 45 MIN: CPT | Mod: ,,, | Performed by: ORTHOPAEDIC SURGERY

## 2022-06-14 PROCEDURE — 73030 XR SHOULDER COMPLETE 2 OR MORE VIEWS LEFT: ICD-10-PCS | Mod: LT,,, | Performed by: ORTHOPAEDIC SURGERY

## 2022-06-14 PROCEDURE — 1159F PR MEDICATION LIST DOCUMENTED IN MEDICAL RECORD: ICD-10-PCS | Mod: CPTII,,, | Performed by: ORTHOPAEDIC SURGERY

## 2022-06-14 PROCEDURE — 73030 X-RAY EXAM OF SHOULDER: CPT | Mod: LT,,, | Performed by: ORTHOPAEDIC SURGERY

## 2022-06-14 PROCEDURE — 3008F BODY MASS INDEX DOCD: CPT | Mod: CPTII,,, | Performed by: ORTHOPAEDIC SURGERY

## 2022-06-14 RX ORDER — PREDNISONE 20 MG/1
20 TABLET ORAL DAILY
Qty: 10 TABLET | Refills: 0 | Status: SHIPPED | OUTPATIENT
Start: 2022-06-14 | End: 2022-06-24

## 2022-06-14 NOTE — PROGRESS NOTES
Chief Complaint:   Chief Complaint   Patient presents with    Left Shoulder - Pain    Pain     Left shoulder pain, patient was seeing  was told she has a tear had XR and MRI done, no prior injury or sx     Consulting Physician: Traci Royal MD      History of present illness:    This is a 56 y.o. year old female presenting with left shoulder pain for several months. This shoulder pain is moderate to severe. Patient reports increased pain with overhead movement. Patient reports night pain. Patient reports anterolateral shoulder pain that radiates down the arm. Patient had no previous treatment. She was evaluated by her hematologist/oncologist Traci Royal MD who ordered an MRI of her left upper extremity. MRI of left upper extremity demonstrated an 8 mm x 8 mm x 8 mm lesion is present to the proximal humeral diaphysis, with a similar appearing smaller lesion is present to the surgical neck, as well as an intermediate grade partial-thickness articular surface tear to the distal infraspinatus tendon.      Past Medical History:   Diagnosis Date    HLD (hyperlipidemia)     HTN (hypertension)     Peripheral neuropathy        Past Surgical History:   Procedure Laterality Date    ANKLE FUSION Left     COLONOSCOPY  01/2019       Current Outpatient Medications   Medication Sig    DULoxetine (CYMBALTA) 60 MG capsule Take 1 capsule by mouth once daily.    gabapentin (NEURONTIN) 600 MG tablet Take 1 tablet by mouth 3 (three) times daily.    meloxicam (MOBIC) 7.5 MG tablet Take 1 tablet by mouth once daily.    traMADoL (ULTRAM) 50 mg tablet Take 1 tablet by mouth every 6 (six) hours.    vitamin B complex-folic acid 0.4 mg Tab Take 1 tablet by mouth once daily.    predniSONE (DELTASONE) 20 MG tablet Take 1 tablet (20 mg total) by mouth once daily. for 10 days     No current facility-administered medications for this visit.       Review of patient's allergies indicates:  No Known  "Allergies    Family History   Problem Relation Age of Onset    Dementia Father     Diabetes Brother     Stroke Paternal Grandmother     Heart attack Paternal Grandmother        Social History     Socioeconomic History    Marital status:    Tobacco Use    Smoking status: Current Every Day Smoker     Packs/day: 0.50    Smokeless tobacco: Never Used   Substance and Sexual Activity    Alcohol use: Never    Drug use: Never           Review of Systems:  All review of systems negative except for those stated in the HPI.    Examination:    Vital Signs:    Vitals:    06/14/22 1058   Weight: 68 kg (150 lb)   Height: 5' 3" (1.6 m)       Body mass index is 26.57 kg/m².    Physical Exam:   General: Well-developed, well-nourished.  Neuro: Alert and oriented x 3.  Psych: Normal mood and affect.  Left Shoulder Exam:  No obvious deformity. No medial or lateral scapula winging. Forward flexion to 90 degrees and abduction to 90 degrees. Positive empty can, positive Whipple, Positive drop arm test, Kessler, impingement, Positive AC joint tenderness. Negative biceps groove tenderness, Positive Jane´s, Yergason´s, Speed test. Negative Apprehension and Relocation test. 4/5 strength, normal skin appearance and palpable pulses distally. Sensibility normal.      Imaging: X-rays ordered and images interpreted today personally by me of the left shoulder demonstrate moderate to severe acromioclavicular joint arthrosis.      Assessment: Nontraumatic tear of left rotator cuff, unspecified tear extent  -     Ambulatory referral/consult to Physical/Occupational Therapy; Future; Expected date: 06/21/2022  -     predniSONE (DELTASONE) 20 MG tablet; Take 1 tablet (20 mg total) by mouth once daily. for 10 days  Dispense: 10 tablet; Refill: 0    Arthrosis of left acromioclavicular joint  -     Ambulatory referral/consult to Physical/Occupational Therapy; Future; Expected date: 06/21/2022  -     predniSONE (DELTASONE) 20 MG tablet; Take " 1 tablet (20 mg total) by mouth once daily. for 10 days  Dispense: 10 tablet; Refill: 0    Left shoulder pain, unspecified chronicity  -     Ambulatory referral/consult to Orthopedics  -     X-Ray Shoulder 2 or More Views Left; Future; Expected date: 06/14/2022        Plan:  X-rays taken today and prior MRI results were reviewed with the patient.  Given that she has had no prior treatment, we discussed multiple conservative options, including medications, subacromial corticosteroid injection, and physical therapy.  Referral entered for formal physical therapy twice weekly.  Prescription routed for prednisone 20 mg to be taken daily for 10 days.  She will return to clinic in approximately 6 weeks for re-evaluation.  At that time, she has not had significant improvement in her symptoms we may consider obtaining an MRI of her left shoulder.  This would provide more detailed images of the rotator cuff tendons.  She verbalized understanding the plan of care with no further questions.           Follow up in about 6 weeks (around 7/26/2022) for Reevaluation.      DISCLAIMER: This note may have been dictated using voice recognition software and may contain grammatical errors.     NOTE: Consult report sent to referring provider via [employer].

## 2022-07-06 ENCOUNTER — LAB VISIT (OUTPATIENT)
Dept: LAB | Facility: HOSPITAL | Age: 57
End: 2022-07-06
Attending: INTERNAL MEDICINE
Payer: COMMERCIAL

## 2022-07-06 DIAGNOSIS — D47.2 MGUS (MONOCLONAL GAMMOPATHY OF UNKNOWN SIGNIFICANCE): ICD-10-CM

## 2022-07-06 DIAGNOSIS — D47.2 MGUS (MONOCLONAL GAMMOPATHY OF UNKNOWN SIGNIFICANCE): Primary | ICD-10-CM

## 2022-07-06 LAB
ALBUMIN SERPL-MCNC: 3.7 GM/DL (ref 3.5–5)
ALBUMIN/GLOB SERPL: 1.2 RATIO (ref 1.1–2)
ALP SERPL-CCNC: 164 UNIT/L (ref 40–150)
ALT SERPL-CCNC: 14 UNIT/L (ref 0–55)
AST SERPL-CCNC: 13 UNIT/L (ref 5–34)
BASOPHILS # BLD AUTO: 0.02 X10(3)/MCL (ref 0–0.2)
BASOPHILS NFR BLD AUTO: 0.3 %
BILIRUBIN DIRECT+TOT PNL SERPL-MCNC: 0.2 MG/DL
BUN SERPL-MCNC: 5.7 MG/DL (ref 9.8–20.1)
CALCIUM SERPL-MCNC: 9.6 MG/DL (ref 8.4–10.2)
CHLORIDE SERPL-SCNC: 106 MMOL/L (ref 98–107)
CO2 SERPL-SCNC: 27 MMOL/L (ref 22–29)
CREAT SERPL-MCNC: 0.69 MG/DL (ref 0.55–1.02)
EOSINOPHIL # BLD AUTO: 0.08 X10(3)/MCL (ref 0–0.9)
EOSINOPHIL NFR BLD AUTO: 1.1 %
ERYTHROCYTE [DISTWIDTH] IN BLOOD BY AUTOMATED COUNT: 14.8 % (ref 11.5–17)
GLOBULIN SER-MCNC: 3.1 GM/DL (ref 2.4–3.5)
GLUCOSE SERPL-MCNC: 56 MG/DL (ref 74–100)
HCT VFR BLD AUTO: 39.9 % (ref 37–47)
HGB BLD-MCNC: 12.8 GM/DL (ref 12–16)
IGA SERPL-MCNC: 123 MG/DL (ref 65–421)
IGG SERPL-MCNC: 1055 MG/DL (ref 522–1631)
IGM SERPL-MCNC: 30 MG/DL (ref 33–293)
IMM GRANULOCYTES # BLD AUTO: 0.01 X10(3)/MCL (ref 0–0.04)
IMM GRANULOCYTES NFR BLD AUTO: 0.1 %
LYMPHOCYTES # BLD AUTO: 2.65 X10(3)/MCL (ref 0.6–4.6)
LYMPHOCYTES NFR BLD AUTO: 34.9 %
MCH RBC QN AUTO: 27.3 PG (ref 27–31)
MCHC RBC AUTO-ENTMCNC: 32.1 MG/DL (ref 33–36)
MCV RBC AUTO: 85.1 FL (ref 80–94)
MONOCYTES # BLD AUTO: 0.58 X10(3)/MCL (ref 0.1–1.3)
MONOCYTES NFR BLD AUTO: 7.6 %
NEUTROPHILS # BLD AUTO: 4.3 X10(3)/MCL (ref 2.1–9.2)
NEUTROPHILS NFR BLD AUTO: 56 %
PLATELET # BLD AUTO: 331 X10(3)/MCL (ref 130–400)
PMV BLD AUTO: 10.7 FL (ref 7.4–10.4)
POTASSIUM SERPL-SCNC: 4.5 MMOL/L (ref 3.5–5.1)
PROT SERPL-MCNC: 6.8 GM/DL (ref 6.4–8.3)
RBC # BLD AUTO: 4.69 X10(6)/MCL (ref 4.2–5.4)
SODIUM SERPL-SCNC: 140 MMOL/L (ref 136–145)
WBC # SPEC AUTO: 7.6 X10(3)/MCL (ref 4.5–11.5)

## 2022-07-06 PROCEDURE — 85025 COMPLETE CBC W/AUTO DIFF WBC: CPT

## 2022-07-06 PROCEDURE — 80053 COMPREHEN METABOLIC PANEL: CPT

## 2022-07-06 PROCEDURE — 82784 ASSAY IGA/IGD/IGG/IGM EACH: CPT

## 2022-07-06 PROCEDURE — 36415 COLL VENOUS BLD VENIPUNCTURE: CPT

## 2022-07-06 PROCEDURE — 84165 PROTEIN E-PHORESIS SERUM: CPT

## 2022-07-07 LAB
KAPPA LC FREE SER-MCNC: 1.88 MG/DL (ref 0.33–1.94)
KAPPA LC FREE/LAMBDA FREE SER: 1.4 {RATIO} (ref 0.26–1.65)
LAMBDA LC FREE SERPL-MCNC: 1.34 MG/DL (ref 0.57–2.63)

## 2022-07-11 ENCOUNTER — OFFICE VISIT (OUTPATIENT)
Dept: HEMATOLOGY/ONCOLOGY | Facility: CLINIC | Age: 57
End: 2022-07-11
Payer: COMMERCIAL

## 2022-07-11 VITALS
HEART RATE: 92 BPM | HEIGHT: 63 IN | SYSTOLIC BLOOD PRESSURE: 138 MMHG | DIASTOLIC BLOOD PRESSURE: 81 MMHG | BODY MASS INDEX: 33.49 KG/M2 | WEIGHT: 189 LBS | OXYGEN SATURATION: 93 % | TEMPERATURE: 99 F

## 2022-07-11 DIAGNOSIS — D47.2 MGUS (MONOCLONAL GAMMOPATHY OF UNKNOWN SIGNIFICANCE): Primary | ICD-10-CM

## 2022-07-11 PROCEDURE — 3008F PR BODY MASS INDEX (BMI) DOCUMENTED: ICD-10-PCS | Mod: CPTII,S$GLB,, | Performed by: INTERNAL MEDICINE

## 2022-07-11 PROCEDURE — 3079F PR MOST RECENT DIASTOLIC BLOOD PRESSURE 80-89 MM HG: ICD-10-PCS | Mod: CPTII,S$GLB,, | Performed by: INTERNAL MEDICINE

## 2022-07-11 PROCEDURE — 3008F BODY MASS INDEX DOCD: CPT | Mod: CPTII,S$GLB,, | Performed by: INTERNAL MEDICINE

## 2022-07-11 PROCEDURE — 99214 PR OFFICE/OUTPT VISIT, EST, LEVL IV, 30-39 MIN: ICD-10-PCS | Mod: S$GLB,,, | Performed by: INTERNAL MEDICINE

## 2022-07-11 PROCEDURE — 1160F PR REVIEW ALL MEDS BY PRESCRIBER/CLIN PHARMACIST DOCUMENTED: ICD-10-PCS | Mod: CPTII,S$GLB,, | Performed by: INTERNAL MEDICINE

## 2022-07-11 PROCEDURE — 99999 PR PBB SHADOW E&M-EST. PATIENT-LVL IV: ICD-10-PCS | Mod: PBBFAC,,, | Performed by: INTERNAL MEDICINE

## 2022-07-11 PROCEDURE — 1160F RVW MEDS BY RX/DR IN RCRD: CPT | Mod: CPTII,S$GLB,, | Performed by: INTERNAL MEDICINE

## 2022-07-11 PROCEDURE — 99999 PR PBB SHADOW E&M-EST. PATIENT-LVL IV: CPT | Mod: PBBFAC,,, | Performed by: INTERNAL MEDICINE

## 2022-07-11 PROCEDURE — 3075F SYST BP GE 130 - 139MM HG: CPT | Mod: CPTII,S$GLB,, | Performed by: INTERNAL MEDICINE

## 2022-07-11 PROCEDURE — 3079F DIAST BP 80-89 MM HG: CPT | Mod: CPTII,S$GLB,, | Performed by: INTERNAL MEDICINE

## 2022-07-11 PROCEDURE — 99214 OFFICE O/P EST MOD 30 MIN: CPT | Mod: S$GLB,,, | Performed by: INTERNAL MEDICINE

## 2022-07-11 PROCEDURE — 3075F PR MOST RECENT SYSTOLIC BLOOD PRESS GE 130-139MM HG: ICD-10-PCS | Mod: CPTII,S$GLB,, | Performed by: INTERNAL MEDICINE

## 2022-07-11 PROCEDURE — 1159F PR MEDICATION LIST DOCUMENTED IN MEDICAL RECORD: ICD-10-PCS | Mod: CPTII,S$GLB,, | Performed by: INTERNAL MEDICINE

## 2022-07-11 PROCEDURE — 1159F MED LIST DOCD IN RCRD: CPT | Mod: CPTII,S$GLB,, | Performed by: INTERNAL MEDICINE

## 2022-07-11 RX ORDER — ESTRADIOL 2 MG/1
1 TABLET ORAL DAILY
COMMUNITY
Start: 2022-05-14

## 2022-07-11 RX ORDER — AMLODIPINE BESYLATE 5 MG/1
TABLET ORAL
COMMUNITY
Start: 2022-05-14

## 2022-07-11 RX ORDER — ATORVASTATIN CALCIUM 40 MG/1
40 TABLET, FILM COATED ORAL DAILY
COMMUNITY
Start: 2022-06-28

## 2022-07-11 RX ORDER — TRAZODONE HYDROCHLORIDE 100 MG/1
100 TABLET ORAL NIGHTLY
COMMUNITY
Start: 2022-07-09

## 2022-07-11 NOTE — PROGRESS NOTES
HEMATOLOGY/ONCOLOGY OFFICE CLINIC VISIT    Visit Information:    Initial Consultation: 9/14/2021  Referring Physician: Dr Grande  Other Physicians:  Code Status: Not addressed    Diagnosis/Problem list:   Monoclonal gammopathy      Present treatment:  Observation    Treatment/Oncology history:    Plan of care:     Imaging:  Skeletal survey 9/20/2021: There is a 4 mm lucent lesion of the proximal left humeral metadiaphysis. Indeterminate subcentimeter lucent lesion of the proximal left humerus. No other suspicious osseous lesion.   CT Head 10/4/2021: Moderate chronic microvascular ischemic changes in the brain. Mild left mastoid air cell fluid, can be seen in the setting of mastoiditis.  CT C/A/P 10/4/2021: Unremarkable contrast enhanced CT of the thorax. No bone lesion appreciated.   skeletal survey on 4/1/22: same unchanged left humerus lucid lesion.   US Soft Tissue Head and Neck 4/20/2022  In the region of concern at the lateral right neck, there is an ovoid lesion measuring 1.1 x 1 x 0.6 cm. This lesion is probably anechoic with increased through transmission compatible with cyst. IMPRESSION: Complicated cyst with internal layering debris at the area of palpable complaint.   MRI left Upper 4/20/2022 FINDINGS: An 8 mm x 8 mm x 8 mm enhancing lesion is present to the proximal humeral diaphysis and is not clearly associated with cortical thinning or periosteal reaction. A similar smaller lesion is present to the surgical neck. No discrete lesion seen to the distal humerus or the included portion of the scapula.  The study is not optimized to evaluate the rotator cuff. An intermediate grade partial-thickness articular surface tear is present to the distal infraspinatus tendon. The labrum is not clearly visible. No os acromiale. Coracoclavicular ligaments are intact. No atrophy of the rotator cuff muscles. No axillary lymphadenopathy. The neurovascular structures appear normal in course and caliber.  IMPRESSION: An 8  "mm x 8 mm x 8 mm lesion is present to the proximal humeral diaphysis. A similar appearing smaller lesion is present to the surgical neck. These would be consistent with multiple myeloma under the appropriate clinical circumstances. Please correlate clinically as to the need for direct tissue acquisition. Intermediate grade partial-thickness articular surface tear to the distal infraspinatus tendon.    Pathology:      CLINICAL HISTORY:       Patient: Roxanne Lima is a 56 y.o. female kindly referred by Dr. Grande for evaluation of gammopathy.  Patient reports that she has "bad neuropathy" since April, after her last Covid "shot". Since then the neuropathy has worsen.  Blood work showed a monoclonal gammopathy with an M spike 0.2 back in June 2021.     No family h/o of multiple myeloma, leukemia blood malignancies.  No exposed to any chemicals that she is aware of. She is disable due to ankle injury were she has some hardware. She has been smoking since 16 years old. Used to smoke 1 ppd but since April 5 cigarettes/day     Skeletal survey 9/20/2021  Findings: Numerous radiographs of the axial and appendicular skeleton are obtained as part of a skeletal survey. There is a 4 mm lucent lesion of the proximal left humeral metadiaphysis. Otherwise no suspicious osseous lesion is seen. Mild degenerative changes of the axial and appendicular skeleton.  Impression: Indeterminate subcentimeter lucent lesion of the proximal left humerus. No other suspicious osseous lesion.     Chief Complaint: No concerns today      Interval History:   Last visit: She reports ongoing worsening  neuropathy in hands and feet. She has gained over 25 pounds since her last visit here in 9/2021. She says that she eats toast with jelly in the mornings and ice cream at night in addition to other high carbohydrate, high fat and high sodium meals.  She denies any fever, chills, or sweats. No chest pain or shortness of breath.      During last visit she " "has a small visible right neck mass (1.5 cm) firm, smooth, mobile. Says that she noticed it 2 months prior. US soft tissue neck to eval right cervical chain mass (LN? dermoid cyst?)  She was also complaining of left shoulder pain. MRI Left upper arm to eval lucent lesion on skeletal survey and shoulder pain. This were discussed with the patient in detail.     She was Refer to Ortho for further eval of left shoulder pain and Intermediate grade partial-thickness articular surface tear to the distal infraspinatus tendon. If need surgery will ask to do biopsy of suspicious lesions and Refer to ENT for further eval of Complicated cyst    7/11/2022: Patient is here today in follow-up.  Since our last visit she was seen by EVANGELIST Godwin and "Given that she has had no prior treatment, we discussed multiple conservative options, including medications, subacromial corticosteroid injection, and physical therapy."  " Referral entered for formal physical therapy twice weekly.  Prescription routed for prednisone 20 mg to be taken daily for 10 days.  She will return to clinic in approximately 6 weeks for re-evaluation.  At that time, she has not had significant improvement in her symptoms we may consider obtaining an MRI of her left shoulder.  This would provide more detailed images of the rotator cuff tendons."       She is on PT. She did not see ENT. Will be sure that she was referred.     Past Medical History:   Diagnosis Date    HLD (hyperlipidemia)     HTN (hypertension)     Peripheral neuropathy       Past Surgical History:   Procedure Laterality Date    ANKLE FUSION Left     COLONOSCOPY  01/2019     Family History   Problem Relation Age of Onset    Dementia Father     Diabetes Brother     Stroke Paternal Grandmother     Heart attack Paternal Grandmother      Social Connections: Not on file       Review of patient's allergies indicates:  No Known Allergies   Current Outpatient Medications on File Prior to Visit "   Medication Sig Dispense Refill    amLODIPine (NORVASC) 5 MG tablet       atorvastatin (LIPITOR) 40 MG tablet Take 40 mg by mouth once daily.      DULoxetine (CYMBALTA) 60 MG capsule Take 1 capsule by mouth once daily.      estradioL (ESTRACE) 2 MG tablet 1 mg once daily.      gabapentin (NEURONTIN) 600 MG tablet Take 1 tablet by mouth 3 (three) times daily.      meloxicam (MOBIC) 7.5 MG tablet Take 1 tablet by mouth once daily.      traMADoL (ULTRAM) 50 mg tablet Take 1 tablet by mouth every 6 (six) hours.      vitamin B complex-folic acid 0.4 mg Tab Take 1 tablet by mouth once daily.      traZODone (DESYREL) 100 MG tablet Take 100 mg by mouth nightly.       No current facility-administered medications on file prior to visit.      Review of Systems   Constitutional: Positive for unexpected weight change. Negative for activity change, appetite change, chills, fatigue and fever.   HENT: Negative for mouth dryness, mouth sores, nosebleeds, sore throat and trouble swallowing.    Eyes: Negative for visual disturbance.   Respiratory: Negative for cough and shortness of breath.    Cardiovascular: Negative for chest pain, palpitations and leg swelling.   Gastrointestinal: Negative for abdominal distention, abdominal pain, blood in stool, change in bowel habit, constipation, diarrhea, nausea, vomiting and change in bowel habit.   Endocrine: Negative.    Genitourinary: Negative for dysuria, frequency, hematuria and urgency.   Musculoskeletal: Positive for back pain. Negative for arthralgias, myalgias and neck pain.   Integumentary:  Negative for rash, breast mass, breast discharge and breast tenderness.   Neurological: Negative for dizziness, tremors, syncope, speech difficulty, weakness, light-headedness, numbness, headaches and memory loss.   Hematological: Negative for adenopathy. Does not bruise/bleed easily.   Psychiatric/Behavioral: Negative for confusion and suicidal ideas. The patient is nervous/anxious.   "  Breast: Negative for mass and tenderness             Vitals:    07/11/22 1454   BP: 138/81   BP Location: Left arm   Patient Position: Sitting   Pulse: 92   Temp: 98.5 °F (36.9 °C)   SpO2: (!) 93%   Weight: 85.7 kg (189 lb)   Height: 5' 3" (1.6 m)      Physical Exam  Vitals and nursing note reviewed.   Constitutional:       General: She is not in acute distress.     Appearance: Normal appearance. She is well-developed.   HENT:      Head: Normocephalic and atraumatic.      Mouth/Throat:      Mouth: Mucous membranes are moist.   Eyes:      General: No scleral icterus.     Extraocular Movements: Extraocular movements intact.      Conjunctiva/sclera: Conjunctivae normal.      Pupils: Pupils are equal, round, and reactive to light.   Neck:      Vascular: No JVD.   Cardiovascular:      Rate and Rhythm: Normal rate and regular rhythm.      Heart sounds: No murmur heard.  Pulmonary:      Effort: Pulmonary effort is normal.      Breath sounds: Normal breath sounds. No wheezing or rhonchi.   Chest:      Chest wall: No deformity or tenderness.   Breasts:      Right: No swelling, mass, nipple discharge, skin change, tenderness, axillary adenopathy or supraclavicular adenopathy.      Left: No swelling, mass, nipple discharge, skin change, tenderness, axillary adenopathy or supraclavicular adenopathy.       Abdominal:      General: Bowel sounds are normal. There is no distension.      Palpations: Abdomen is soft. There is no mass.      Tenderness: There is no abdominal tenderness.   Musculoskeletal:         General: No swelling or deformity.      Cervical back: Neck supple.   Lymphadenopathy:      Cervical: No cervical adenopathy.      Upper Body:      Right upper body: No supraclavicular or axillary adenopathy.      Left upper body: No supraclavicular or axillary adenopathy.      Lower Body: No right inguinal adenopathy. No left inguinal adenopathy.   Skin:     General: Skin is warm.      Coloration: Skin is not jaundiced.     "  Findings: No lesion or rash.      Nails: There is no clubbing.   Neurological:      General: No focal deficit present.      Mental Status: She is alert and oriented to person, place, and time.      Sensory: Sensation is intact.      Motor: Motor function is intact.      Gait: Gait is intact.   Psychiatric:         Attention and Perception: Attention normal.         Mood and Affect: Mood and affect normal.         Speech: Speech normal.         Behavior: Behavior is cooperative.         Thought Content: Thought content normal.         Cognition and Memory: Cognition normal.         Judgment: Judgment normal.       ECOG SCORE           Laboratory:  CBC with Differential:  Lab Results   Component Value Date    WBC 7.6 07/06/2022    RBC 4.69 07/06/2022    HGB 12.8 07/06/2022    HCT 39.9 07/06/2022    MCV 85.1 07/06/2022    MCH 27.3 07/06/2022    MCHC 32.1 (L) 07/06/2022    RDW 14.8 07/06/2022     07/06/2022    MPV 10.7 (H) 07/06/2022        CMP:  Sodium Level   Date Value Ref Range Status   07/06/2022 140 136 - 145 mmol/L Final     Potassium Level   Date Value Ref Range Status   07/06/2022 4.5 3.5 - 5.1 mmol/L Final     Carbon Dioxide   Date Value Ref Range Status   07/06/2022 27 22 - 29 mmol/L Final     Blood Urea Nitrogen   Date Value Ref Range Status   07/06/2022 5.7 (L) 9.8 - 20.1 mg/dL Final     Creatinine   Date Value Ref Range Status   07/06/2022 0.69 0.55 - 1.02 mg/dL Final     Calcium Level Total   Date Value Ref Range Status   07/06/2022 9.6 8.4 - 10.2 mg/dL Final     Albumin Level   Date Value Ref Range Status   07/06/2022 3.4 (L) 3.5 - 5.0 gm/dL Final   07/06/2022 3.7 3.5 - 5.0 gm/dL Final     Bilirubin Total   Date Value Ref Range Status   07/06/2022 0.2 <=1.5 mg/dL Final     Alkaline Phosphatase   Date Value Ref Range Status   07/06/2022 164 (H) 40 - 150 unit/L Final     Aspartate Aminotransferase   Date Value Ref Range Status   07/06/2022 13 5 - 34 unit/L Final     Alanine Aminotransferase   Date  Value Ref Range Status   07/06/2022 14 0 - 55 unit/L Final     Estimated GFR-Non    Date Value Ref Range Status   04/01/2022 >60        Component Ref Range & Units 5 d ago    [place] Free Light Chain 0.3300 - 1.94 mg/dL 1.88    Lambda Free Light Chain 0.5700 - 2.63 mg/dL 1.34    Kappa/Lambda FLC Ratio 0.2600 - 1.65 1.40       Latest Reference Range & Units 07/06/22 11:18   IgG 522.00 - 1,631.00 mg/dL 1,055.00   IgM 33.0 - 293.0 mg/dL 30.0 (L)   IgA 65.0 - 421.0 mg/dL 123.0   (L): Data is abnormally low       Assessment:       1. MGUS (monoclonal gammopathy of unknown significance)        Monoclonal gammopathy-RESOLVED??.  Peripheral Neuropathy  Smoker since 16 years of age        Plan:         RTC in 6 months with MM labs   Will refer back to ENT for further eval of Complicated cyst  If everything is good and No evidence of malignany and no monoclonal protein then she will RTC prn     Encouraged to call for any questions or problems.  The patient was given ample opportunity to ask questions and they were all answered to satisfaction; patient demonstrated understanding of what we discussed and is agreeable to the plan.    THEE NEWTON MD                  Answers for HPI/ROS submitted by the patient on 7/5/2022  appetite change : No  unexpected weight change: Yes  mouth sores: No  visual disturbance: No  cough: No  shortness of breath: No  chest pain: No  abdominal pain: No  diarrhea: No  frequency: No  back pain: Yes  rash: No  headaches: No  adenopathy: No  nervous/ anxious: Yes

## 2022-07-12 ENCOUNTER — TELEPHONE (OUTPATIENT)
Dept: HEMATOLOGY/ONCOLOGY | Facility: CLINIC | Age: 57
End: 2022-07-12
Payer: COMMERCIAL

## 2022-07-12 LAB
ALBUMIN % SPEP (OHS): ABNORMAL
ALBUMIN SERPL-MCNC: 3.4 GM/DL (ref 3.5–5)
ALBUMIN/GLOB SERPL: 0.9 RATIO (ref 1.1–2)
ALPHA 1 GLOB (OHS): 0.3 GM/DL (ref 0–0.4)
ALPHA 1 GLOB% (OHS): 0.3 (ref 2.3–4.9)
ALPHA 2 GLOB % (OHS): ABNORMAL
ALPHA 2 GLOB (OHS): 1.1 GM/DL (ref 0.4–1)
BETA GLOB (OHS): 1.3 GM/DL (ref 0.7–1.3)
BETA GLOB% (OHS): ABNORMAL
GAMMA GLOBULIN % (OHS): ABNORMAL
GAMMA GLOBULIN (OHS): 0.9 GM/DL (ref 0.4–1.8)
GLOBULIN SER-MCNC: 3.6 GM/DL (ref 2.4–3.5)
M SPIKE % (OHS): ABNORMAL
M SPIKE (OHS): ABNORMAL
PEP NOTE (OHS): ABNORMAL
PROT SERPL-MCNC: 7 GM/DL (ref 6.4–8.3)

## 2022-07-12 NOTE — TELEPHONE ENCOUNTER
"ENT. Please be sure that she was referred for eval of complicated "cyst" in her neck and on imaging    "

## 2022-07-14 DIAGNOSIS — R22.1 MASS IN NECK: Primary | ICD-10-CM

## 2022-07-14 DIAGNOSIS — D47.2 MGUS (MONOCLONAL GAMMOPATHY OF UNKNOWN SIGNIFICANCE): ICD-10-CM

## 2022-11-21 ENCOUNTER — LAB VISIT (OUTPATIENT)
Dept: LAB | Facility: HOSPITAL | Age: 57
End: 2022-11-21
Attending: FAMILY MEDICINE
Payer: COMMERCIAL

## 2022-11-21 DIAGNOSIS — F45.8 ANXIETY HYPERVENTILATION: Primary | ICD-10-CM

## 2022-11-21 DIAGNOSIS — F41.9 ANXIETY HYPERVENTILATION: Primary | ICD-10-CM

## 2022-11-21 DIAGNOSIS — E78.5 HYPERLIPIDEMIA, UNSPECIFIED HYPERLIPIDEMIA TYPE: ICD-10-CM

## 2022-11-21 DIAGNOSIS — G62.9 PERIPHERAL NERVE DISORDER: ICD-10-CM

## 2022-11-21 DIAGNOSIS — N95.9 MENOPAUSAL PROBLEM: ICD-10-CM

## 2022-11-21 DIAGNOSIS — M25.572 LEFT ANKLE PAIN: ICD-10-CM

## 2022-11-21 DIAGNOSIS — F39 MILD MOOD DISORDER: ICD-10-CM

## 2022-11-21 DIAGNOSIS — I10 ESSENTIAL HYPERTENSION, MALIGNANT: ICD-10-CM

## 2022-11-21 DIAGNOSIS — K63.5 HYPERPLASTIC POLYP OF INTESTINE: ICD-10-CM

## 2022-11-21 LAB
ALBUMIN SERPL-MCNC: 3.4 GM/DL (ref 3.5–5)
ALBUMIN/GLOB SERPL: 0.9 RATIO (ref 1.1–2)
ALP SERPL-CCNC: 156 UNIT/L (ref 40–150)
ALT SERPL-CCNC: 12 UNIT/L (ref 0–55)
APPEARANCE UR: ABNORMAL
AST SERPL-CCNC: 12 UNIT/L (ref 5–34)
BACTERIA #/AREA URNS AUTO: NORMAL /HPF
BILIRUB UR QL STRIP.AUTO: ABNORMAL MG/DL
BILIRUBIN DIRECT+TOT PNL SERPL-MCNC: 0.2 MG/DL
BUN SERPL-MCNC: 8.4 MG/DL (ref 9.8–20.1)
CALCIUM SERPL-MCNC: 9.3 MG/DL (ref 8.4–10.2)
CHLORIDE SERPL-SCNC: 103 MMOL/L (ref 98–107)
CHOLEST SERPL-MCNC: 213 MG/DL
CHOLEST/HDLC SERPL: 5 {RATIO} (ref 0–5)
CO2 SERPL-SCNC: 26 MMOL/L (ref 22–29)
COLOR UR AUTO: YELLOW
CREAT SERPL-MCNC: 0.74 MG/DL (ref 0.55–1.02)
ERYTHROCYTE [DISTWIDTH] IN BLOOD BY AUTOMATED COUNT: 15.6 % (ref 11.5–17)
GFR SERPLBLD CREATININE-BSD FMLA CKD-EPI: >60 MLS/MIN/1.73/M2
GLOBULIN SER-MCNC: 3.6 GM/DL (ref 2.4–3.5)
GLUCOSE SERPL-MCNC: 148 MG/DL (ref 74–100)
GLUCOSE UR QL STRIP.AUTO: NEGATIVE MG/DL
HCT VFR BLD AUTO: 40.5 % (ref 37–47)
HDLC SERPL-MCNC: 45 MG/DL (ref 35–60)
HGB BLD-MCNC: 13 GM/DL (ref 12–16)
KETONES UR QL STRIP.AUTO: 15 MG/DL
LDLC SERPL CALC-MCNC: 146 MG/DL (ref 50–140)
LEUKOCYTE ESTERASE UR QL STRIP.AUTO: NEGATIVE UNIT/L
MCH RBC QN AUTO: 26.9 PG (ref 27–31)
MCHC RBC AUTO-ENTMCNC: 32.1 MG/DL (ref 33–36)
MCV RBC AUTO: 83.9 FL (ref 80–94)
NITRITE UR QL STRIP.AUTO: NEGATIVE
PH UR STRIP.AUTO: 7 [PH]
PLATELET # BLD AUTO: 328 X10(3)/MCL (ref 130–400)
PMV BLD AUTO: 10.7 FL (ref 7.4–10.4)
POTASSIUM SERPL-SCNC: 3.7 MMOL/L (ref 3.5–5.1)
PROT SERPL-MCNC: 7 GM/DL (ref 6.4–8.3)
PROT UR QL STRIP.AUTO: 30 MG/DL
RBC # BLD AUTO: 4.83 X10(6)/MCL (ref 4.2–5.4)
RBC #/AREA URNS AUTO: NORMAL /HPF
RBC UR QL AUTO: ABNORMAL UNIT/L
SODIUM SERPL-SCNC: 140 MMOL/L (ref 136–145)
SP GR UR STRIP.AUTO: 1.02
SQUAMOUS #/AREA URNS AUTO: NORMAL /HPF
TRIGL SERPL-MCNC: 109 MG/DL (ref 37–140)
TSH SERPL-ACNC: 0.58 UIU/ML (ref 0.35–4.94)
UROBILINOGEN UR STRIP-ACNC: 1 MG/DL
VLDLC SERPL CALC-MCNC: 22 MG/DL
WBC # SPEC AUTO: 6.3 X10(3)/MCL (ref 4.5–11.5)
WBC #/AREA URNS AUTO: NORMAL /HPF

## 2022-11-21 PROCEDURE — 81001 URINALYSIS AUTO W/SCOPE: CPT

## 2022-11-21 PROCEDURE — 84443 ASSAY THYROID STIM HORMONE: CPT

## 2022-11-21 PROCEDURE — 80061 LIPID PANEL: CPT

## 2022-11-21 PROCEDURE — 36415 COLL VENOUS BLD VENIPUNCTURE: CPT

## 2022-11-21 PROCEDURE — 85027 COMPLETE CBC AUTOMATED: CPT

## 2022-11-21 PROCEDURE — 80053 COMPREHEN METABOLIC PANEL: CPT

## 2022-11-21 PROCEDURE — 81003 URINALYSIS AUTO W/O SCOPE: CPT

## 2022-12-19 ENCOUNTER — DOCUMENTATION ONLY (OUTPATIENT)
Dept: FAMILY MEDICINE | Facility: CLINIC | Age: 57
End: 2022-12-19
Payer: COMMERCIAL

## 2023-01-11 ENCOUNTER — TELEPHONE (OUTPATIENT)
Dept: HEMATOLOGY/ONCOLOGY | Facility: CLINIC | Age: 58
End: 2023-01-11
Payer: COMMERCIAL

## 2023-01-11 NOTE — TELEPHONE ENCOUNTER
CALLED TO CONFIRM IF PT WAS SEEN BY DR. BURNETTE FOR CYST THAT SHE WAS REFERRED TO HIM FOR.  STATED PT WAS SCHEDULED FOR 8/2/22 @1015, BUT CALLED TO CANCEL APPT AND DID NOT R/S.

## 2023-01-19 ENCOUNTER — LAB VISIT (OUTPATIENT)
Dept: LAB | Facility: HOSPITAL | Age: 58
End: 2023-01-19
Attending: FAMILY MEDICINE
Payer: COMMERCIAL

## 2023-01-19 DIAGNOSIS — F45.8 ANXIETY HYPERVENTILATION: Primary | ICD-10-CM

## 2023-01-19 DIAGNOSIS — F39 MILD MOOD DISORDER: ICD-10-CM

## 2023-01-19 DIAGNOSIS — N95.9 MENOPAUSAL PROBLEM: ICD-10-CM

## 2023-01-19 DIAGNOSIS — G62.9 PERIPHERAL NERVE DISORDER: ICD-10-CM

## 2023-01-19 DIAGNOSIS — M25.572 LEFT ANKLE PAIN: ICD-10-CM

## 2023-01-19 DIAGNOSIS — K63.5 HYPERPLASTIC POLYP OF INTESTINE: ICD-10-CM

## 2023-01-19 DIAGNOSIS — E78.5 HYPERLIPIDEMIA, UNSPECIFIED HYPERLIPIDEMIA TYPE: ICD-10-CM

## 2023-01-19 DIAGNOSIS — I10 ESSENTIAL HYPERTENSION, MALIGNANT: ICD-10-CM

## 2023-01-19 DIAGNOSIS — F41.9 ANXIETY HYPERVENTILATION: Primary | ICD-10-CM

## 2023-01-19 LAB
ALBUMIN SERPL-MCNC: 3.5 G/DL (ref 3.5–5)
ALBUMIN/GLOB SERPL: 0.9 RATIO (ref 1.1–2)
ALP SERPL-CCNC: 133 UNIT/L (ref 40–150)
ALT SERPL-CCNC: 13 UNIT/L (ref 0–55)
AST SERPL-CCNC: 13 UNIT/L (ref 5–34)
BILIRUBIN DIRECT+TOT PNL SERPL-MCNC: 0.3 MG/DL
BUN SERPL-MCNC: 5.3 MG/DL (ref 9.8–20.1)
CALCIUM SERPL-MCNC: 9.2 MG/DL (ref 8.4–10.2)
CHLORIDE SERPL-SCNC: 102 MMOL/L (ref 98–107)
CHOLEST SERPL-MCNC: 212 MG/DL
CHOLEST/HDLC SERPL: 4 {RATIO} (ref 0–5)
CO2 SERPL-SCNC: 27 MMOL/L (ref 22–29)
CREAT SERPL-MCNC: 0.7 MG/DL (ref 0.55–1.02)
GFR SERPLBLD CREATININE-BSD FMLA CKD-EPI: >60 MLS/MIN/1.73/M2
GLOBULIN SER-MCNC: 3.7 GM/DL (ref 2.4–3.5)
GLUCOSE SERPL-MCNC: 120 MG/DL (ref 74–100)
HDLC SERPL-MCNC: 58 MG/DL (ref 35–60)
LDLC SERPL CALC-MCNC: 133 MG/DL (ref 50–140)
POTASSIUM SERPL-SCNC: 3.7 MMOL/L (ref 3.5–5.1)
PROT SERPL-MCNC: 7.2 GM/DL (ref 6.4–8.3)
SODIUM SERPL-SCNC: 141 MMOL/L (ref 136–145)
TRIGL SERPL-MCNC: 107 MG/DL (ref 37–140)
VLDLC SERPL CALC-MCNC: 21 MG/DL

## 2023-01-19 PROCEDURE — 36415 COLL VENOUS BLD VENIPUNCTURE: CPT

## 2023-01-19 PROCEDURE — 80053 COMPREHEN METABOLIC PANEL: CPT

## 2023-01-19 PROCEDURE — 80061 LIPID PANEL: CPT

## 2023-07-05 ENCOUNTER — LAB VISIT (OUTPATIENT)
Dept: LAB | Facility: HOSPITAL | Age: 58
End: 2023-07-05
Attending: FAMILY MEDICINE
Payer: COMMERCIAL

## 2023-07-05 DIAGNOSIS — R73.9 BLOOD GLUCOSE ELEVATED: ICD-10-CM

## 2023-07-05 DIAGNOSIS — F41.9 ANXIETY HYPERVENTILATION: Primary | ICD-10-CM

## 2023-07-05 DIAGNOSIS — M25.572 LEFT ANKLE PAIN: ICD-10-CM

## 2023-07-05 DIAGNOSIS — N95.9 MENOPAUSAL PROBLEM: ICD-10-CM

## 2023-07-05 DIAGNOSIS — I10 ESSENTIAL HYPERTENSION, MALIGNANT: ICD-10-CM

## 2023-07-05 DIAGNOSIS — E78.5 HYPERLIPIDEMIA, UNSPECIFIED HYPERLIPIDEMIA TYPE: ICD-10-CM

## 2023-07-05 DIAGNOSIS — F45.8 ANXIETY HYPERVENTILATION: Primary | ICD-10-CM

## 2023-07-05 DIAGNOSIS — G62.9 PERIPHERAL NERVE DISORDER: ICD-10-CM

## 2023-07-05 DIAGNOSIS — F39 MILD MOOD DISORDER: ICD-10-CM

## 2023-07-05 DIAGNOSIS — K63.5 HYPERPLASTIC POLYP OF INTESTINE: ICD-10-CM

## 2023-07-05 LAB
ALBUMIN SERPL-MCNC: 3.4 G/DL (ref 3.5–5)
ALBUMIN/GLOB SERPL: 1 RATIO (ref 1.1–2)
ALP SERPL-CCNC: 142 UNIT/L (ref 40–150)
ALT SERPL-CCNC: 14 UNIT/L (ref 0–55)
AST SERPL-CCNC: 15 UNIT/L (ref 5–34)
BILIRUBIN DIRECT+TOT PNL SERPL-MCNC: 0.2 MG/DL
BUN SERPL-MCNC: 5.8 MG/DL (ref 9.8–20.1)
CALCIUM SERPL-MCNC: 9.5 MG/DL (ref 8.4–10.2)
CHLORIDE SERPL-SCNC: 103 MMOL/L (ref 98–107)
CO2 SERPL-SCNC: 28 MMOL/L (ref 22–29)
CREAT SERPL-MCNC: 0.61 MG/DL (ref 0.55–1.02)
EST. AVERAGE GLUCOSE BLD GHB EST-MCNC: 114 MG/DL
GFR SERPLBLD CREATININE-BSD FMLA CKD-EPI: >60 MLS/MIN/1.73/M2
GLOBULIN SER-MCNC: 3.4 GM/DL (ref 2.4–3.5)
GLUCOSE SERPL-MCNC: 68 MG/DL (ref 74–100)
HBA1C MFR BLD: 5.6 %
POTASSIUM SERPL-SCNC: 4.2 MMOL/L (ref 3.5–5.1)
PROT SERPL-MCNC: 6.8 GM/DL (ref 6.4–8.3)
SODIUM SERPL-SCNC: 139 MMOL/L (ref 136–145)

## 2023-07-05 PROCEDURE — 83036 HEMOGLOBIN GLYCOSYLATED A1C: CPT

## 2023-07-05 PROCEDURE — 80053 COMPREHEN METABOLIC PANEL: CPT

## 2023-07-05 PROCEDURE — 36415 COLL VENOUS BLD VENIPUNCTURE: CPT

## 2024-02-22 ENCOUNTER — HOSPITAL ENCOUNTER (OUTPATIENT)
Dept: RADIOLOGY | Facility: HOSPITAL | Age: 59
Discharge: HOME | End: 2024-02-22
Attending: FAMILY MEDICINE
Payer: COMMERCIAL

## 2024-02-22 DIAGNOSIS — R92.8 ABNORMAL MAMMOGRAM: ICD-10-CM

## 2024-02-22 PROCEDURE — 77066 DX MAMMO INCL CAD BI: CPT | Mod: TC

## 2024-02-22 PROCEDURE — 77062 BREAST TOMOSYNTHESIS BI: CPT | Mod: 26,,, | Performed by: RADIOLOGY

## 2024-02-22 PROCEDURE — 77066 DX MAMMO INCL CAD BI: CPT | Mod: 26,,, | Performed by: RADIOLOGY

## 2024-04-22 DIAGNOSIS — L98.9 LESION OF NECK: Primary | ICD-10-CM

## 2024-05-21 ENCOUNTER — OFFICE VISIT (OUTPATIENT)
Dept: OTOLARYNGOLOGY | Facility: CLINIC | Age: 59
End: 2024-05-21
Payer: COMMERCIAL

## 2024-05-21 VITALS — BODY MASS INDEX: 29.23 KG/M2 | WEIGHT: 165 LBS | HEIGHT: 63 IN

## 2024-05-21 DIAGNOSIS — L72.0 EPIDERMAL CYST OF NECK: Primary | ICD-10-CM

## 2024-05-21 DIAGNOSIS — F41.9 ANXIETY DUE TO INVASIVE PROCEDURE: ICD-10-CM

## 2024-05-21 PROCEDURE — 99213 OFFICE O/P EST LOW 20 MIN: CPT | Mod: PBBFAC | Performed by: OTOLARYNGOLOGY

## 2024-05-21 RX ORDER — QUETIAPINE FUMARATE 100 MG/1
100 TABLET, FILM COATED ORAL NIGHTLY
COMMUNITY
Start: 2024-05-15

## 2024-05-21 RX ORDER — BUSPIRONE HYDROCHLORIDE 10 MG/1
10 TABLET ORAL 2 TIMES DAILY
COMMUNITY
Start: 2024-04-22

## 2024-05-21 RX ORDER — DICLOFENAC SODIUM 50 MG/1
50 TABLET, DELAYED RELEASE ORAL 2 TIMES DAILY
COMMUNITY
Start: 2024-04-12

## 2024-05-21 RX ORDER — ROPINIROLE 1 MG/1
1 TABLET, FILM COATED ORAL NIGHTLY
COMMUNITY
Start: 2024-04-22

## 2024-05-21 RX ORDER — DIAZEPAM 5 MG/1
TABLET ORAL
Qty: 2 TABLET | Refills: 0 | Status: SHIPPED | OUTPATIENT
Start: 2024-05-21

## 2024-05-21 NOTE — PROGRESS NOTES
Patient Name: Roxanne Lima   YOB: 1965     Chief Complaint:   Chief Complaint   Patient presents with    lesion on neck     Right side x 2 years. Consistent in size with occ swelling. No drainage        History of Present Illness:  5/21/2024:   58-year-old female presents today for evaluation of mass on the right side of her neck.  The patient had indicated that this lesion has been present for proximally 2 years.  It may fluctuate in size small amount but overall has been stable.  She is unaware of any modifying factors.  She does not have any associated sore throat, ear pain, hemoptysis, hoarseness, difficulty swallowing, or other swelling in the neck.  Lesion has never been treated with medication or with surgery.  She had an ultrasound of the neck done on 4/20/2022, which showed the presence of a 1.1 x 1.0 x 0.6 cm lesion in the right lateral neck consistent with a cyst.  The patient has not had any other treatment or evaluation for this.  Patient currently smokes 1/2 pack of cigarettes per day.    Past Medical History:  Past Medical History:   Diagnosis Date    HLD (hyperlipidemia)     HTN (hypertension)     Peripheral neuropathy      Past Surgical History:   Procedure Laterality Date    ANKLE FUSION Left     ANKLE FUSION Left     COLONOSCOPY  01/2019    HYSTERECTOMY         Social History:  Social History     Socioeconomic History    Marital status:    Tobacco Use    Smoking status: Every Day     Current packs/day: 0.50     Types: Cigarettes    Smokeless tobacco: Current   Substance and Sexual Activity    Alcohol use: Never    Drug use: Never       Review of Systems:  Unremarkable except as mentioned above.    Current Medications:  Current Outpatient Medications   Medication Sig    amLODIPine (NORVASC) 5 MG tablet     atorvastatin (LIPITOR) 40 MG tablet Take 40 mg by mouth once daily.    busPIRone (BUSPAR) 10 MG tablet Take 10 mg by mouth 2 (two) times daily.    diclofenac (VOLTAREN) 50  "MG EC tablet Take 50 mg by mouth 2 (two) times daily.    DULoxetine (CYMBALTA) 60 MG capsule Take 1 capsule by mouth once daily.    estradioL (ESTRACE) 2 MG tablet 1 mg once daily.    QUEtiapine (SEROQUEL) 100 MG Tab Take 100 mg by mouth every evening.    rOPINIRole (REQUIP) 1 MG tablet Take 1 mg by mouth every evening.    traMADoL (ULTRAM) 50 mg tablet Take 1 tablet by mouth every 6 (six) hours.    traZODone (DESYREL) 100 MG tablet Take 100 mg by mouth nightly.    gabapentin (NEURONTIN) 600 MG tablet Take 1 tablet by mouth 3 (three) times daily. (Patient not taking: Reported on 5/21/2024)    vitamin B complex-folic acid 0.4 mg Tab Take 1 tablet by mouth once daily. (Patient not taking: Reported on 5/21/2024)     No current facility-administered medications for this visit.        Allergies:  Review of patient's allergies indicates:  No Known Allergies     Family History:  Family History   Problem Relation Name Age of Onset    Dementia Father      Diabetes Brother      Stroke Paternal Grandmother      Heart attack Paternal Grandmother         Physical Exam:  Vital signs:   Vitals:    05/21/24 1255   Weight: 74.8 kg (165 lb)   Height: 5' 3" (1.6 m)   General:  Well-developed well-nourished female in no acute distress.  Voice is normal.  Head and face:  Normocephalic.  No facial lesions.  No temporomandibular joint tenderness or click.  Ears:  Right ear-auricle is normally developed.  External auditory canal is clear.  Tympanic membrane is nonerythematous.  No middle ear effusion.  Left ear-auricle is normally developed.  External auditory canal is clear.  Tympanic membrane is nonerythematous.  No middle ear effusion.  Nose: Nasal dorsum unremarkable.  She does have left septal deviation.  No congestion or abnormal drainage.    Oral cavity and oropharynx:  Tongue and floor mouth are without lesions.  Mucosa is moist.  No pharyngeal erythema or exudates.  No oropharyngeal masses.  No tonsillar hypertrophy.  Neck:  " Supple without palpable adenopathy or thyromegaly.  Trachea in the midline.  Parotid and submandibular glands are normal to palpation.  There is a 1.75 cm subcutaneous nodular lesion which is soft in the right upper lateral neck.  There is no overlying erythema or tenderness.    Eyes:  Extraocular muscles intact.  No nystagmus.  No exophthalmos or enophthalmos.  Neurologic:  Alert and oriented.  Cranial nerves 2-12 are grossly normal.        Assessment/Plan:  Probable epidermal cyst of the right neck.      Plan:   Treatment options were discussed with the patient.  This includes no intervention versus surgical excision.  Patient would like to have the lesion excised.  Will have this scheduled to be done in the clinic under local anesthetic next available procedure day.    Valium 5 mg p.o. 30 minutes before procedure.  May repeat once just prior to procedure.  The patient indicates she does become anxious for invasive procedures.    Js Pastor M.D.

## 2024-06-19 ENCOUNTER — PROCEDURE VISIT (OUTPATIENT)
Dept: OTOLARYNGOLOGY | Facility: CLINIC | Age: 59
End: 2024-06-19
Payer: COMMERCIAL

## 2024-06-19 VITALS
TEMPERATURE: 98 F | HEIGHT: 63 IN | DIASTOLIC BLOOD PRESSURE: 81 MMHG | WEIGHT: 163 LBS | SYSTOLIC BLOOD PRESSURE: 131 MMHG | HEART RATE: 97 BPM | BODY MASS INDEX: 28.88 KG/M2

## 2024-06-19 DIAGNOSIS — L72.0 EPIDERMAL CYST OF NECK: Primary | ICD-10-CM

## 2024-06-19 PROCEDURE — 11442 EXC FACE-MM B9+MARG 1.1-2 CM: CPT | Mod: PBBFAC | Performed by: STUDENT IN AN ORGANIZED HEALTH CARE EDUCATION/TRAINING PROGRAM

## 2024-06-19 PROCEDURE — 88304 TISSUE EXAM BY PATHOLOGIST: CPT | Mod: TC | Performed by: STUDENT IN AN ORGANIZED HEALTH CARE EDUCATION/TRAINING PROGRAM

## 2024-06-19 NOTE — PROCEDURES
PROCEDURE: Excision of R neck mass    Indication: Epidermal inclusion cyst    Anesthesia: 1% lidocaine with 1:100,000 epinephrine    Findings: Cyst 1.5 cm in diameter. Incision 2 cm x 1 cm    Specimen(s): R neck cyst    Blood Loss: Minimal     The risks and benefits of procedure discussed with patient.  Informed consent was given to proceed.     Procedure In Detail: First, the area was injected with 1% lidocaine with 1:100,000 epinephrine. After adequate time had elapsed for anesthesia to take effect, a 15 blade was used to make an incision. Next, fine tissue scissors were used to dissect the soft tissue around the mass entirely. This was removed in its entirety and placed into formalin to be sent to pathology. Hemostasis was achieved using a handheld electrocautery device. The wound was closed in two layers. The deep layer was closed with 4-0 Monocryl and the superficial layer was closed with 4-0 Plain gut. The patient tolerated the procedure well. There were no complications.     Post-Procedure Plan:   Aquaphor to incision.   Tylenol/ibuprofen PRN for pain.   RTC 1 week for path review and wound check    Truong Abrams MD  [employer] Otolaryngology PGY-3  06/19/2024 11:41 AM

## 2024-06-20 LAB
ESTROGEN SERPL-MCNC: NORMAL PG/ML
INSULIN SERPL-ACNC: NORMAL U[IU]/ML
LAB AP CLINICAL INFORMATION: NORMAL
LAB AP GROSS DESCRIPTION: NORMAL
LAB AP REPORT FOOTNOTES: NORMAL

## 2024-06-20 NOTE — PROGRESS NOTES
I have reviewed and agree with the resident's findings, including all diagnostic interpretations and plans as written.     Js Pastor M.D.

## 2024-06-25 ENCOUNTER — OFFICE VISIT (OUTPATIENT)
Dept: OTOLARYNGOLOGY | Facility: CLINIC | Age: 59
End: 2024-06-25
Payer: COMMERCIAL

## 2024-06-25 VITALS
DIASTOLIC BLOOD PRESSURE: 77 MMHG | OXYGEN SATURATION: 99 % | TEMPERATURE: 97 F | HEIGHT: 63 IN | WEIGHT: 162.94 LBS | SYSTOLIC BLOOD PRESSURE: 122 MMHG | RESPIRATION RATE: 20 BRPM | HEART RATE: 88 BPM | BODY MASS INDEX: 28.87 KG/M2

## 2024-06-25 DIAGNOSIS — L72.0 EPIDERMAL CYST OF NECK: Primary | ICD-10-CM

## 2024-06-25 PROCEDURE — 99214 OFFICE O/P EST MOD 30 MIN: CPT | Mod: PBBFAC | Performed by: STUDENT IN AN ORGANIZED HEALTH CARE EDUCATION/TRAINING PROGRAM

## 2024-06-25 NOTE — PROGRESS NOTES
Patient Name: Roxanne Lima   YOB: 1965     Chief Complaint:   Chief Complaint   Patient presents with    PATH results        History of Present Illness:  5/21/2024:   58-year-old female presents today for evaluation of mass on the right side of her neck.  The patient had indicated that this lesion has been present for proximally 2 years.  It may fluctuate in size small amount but overall has been stable.  She is unaware of any modifying factors.  She does not have any associated sore throat, ear pain, hemoptysis, hoarseness, difficulty swallowing, or other swelling in the neck.  Lesion has never been treated with medication or with surgery.  She had an ultrasound of the neck done on 4/20/2022, which showed the presence of a 1.1 x 1.0 x 0.6 cm lesion in the right lateral neck consistent with a cyst.  The patient has not had any other treatment or evaluation for this.  Patient currently smokes 1/2 pack of cigarettes per day.    6/25/24:  Here today in follow up.  Reports no issues.  Pain improving.  Protecting from sun and apply Aquaphor.    Past Medical History:  Past Medical History:   Diagnosis Date    HLD (hyperlipidemia)     HTN (hypertension)     Peripheral neuropathy      Past Surgical History:   Procedure Laterality Date    ANKLE FUSION Left     ANKLE FUSION Left     COLONOSCOPY  01/2019    HYSTERECTOMY         Social History:  Social History     Socioeconomic History    Marital status:    Tobacco Use    Smoking status: Every Day     Current packs/day: 0.50     Types: Cigarettes    Smokeless tobacco: Current   Substance and Sexual Activity    Alcohol use: Never    Drug use: Never       Review of Systems:  Unremarkable except as mentioned above.    Current Medications:  Current Outpatient Medications   Medication Sig    amLODIPine (NORVASC) 5 MG tablet     atorvastatin (LIPITOR) 40 MG tablet Take 40 mg by mouth once daily.    busPIRone (BUSPAR) 10 MG tablet Take 10 mg by mouth 2 (two)  "times daily.    diazePAM (VALIUM) 5 MG tablet Take one tablet 30 minutes before procedure. Take second tablet before procedure if necessary.    diclofenac (VOLTAREN) 50 MG EC tablet Take 50 mg by mouth 2 (two) times daily.    DULoxetine (CYMBALTA) 60 MG capsule Take 1 capsule by mouth once daily.    estradioL (ESTRACE) 2 MG tablet 1 mg once daily.    gabapentin (NEURONTIN) 600 MG tablet Take 1 tablet by mouth 3 (three) times daily. (Patient not taking: Reported on 5/21/2024)    QUEtiapine (SEROQUEL) 100 MG Tab Take 100 mg by mouth every evening.    rOPINIRole (REQUIP) 1 MG tablet Take 1 mg by mouth every evening.    traMADoL (ULTRAM) 50 mg tablet Take 1 tablet by mouth every 6 (six) hours.    traZODone (DESYREL) 100 MG tablet Take 100 mg by mouth nightly.    vitamin B complex-folic acid 0.4 mg Tab Take 1 tablet by mouth once daily. (Patient not taking: Reported on 5/21/2024)     No current facility-administered medications for this visit.        Allergies:  Review of patient's allergies indicates:  No Known Allergies     Family History:  Family History   Problem Relation Name Age of Onset    Dementia Father      Diabetes Brother      Stroke Paternal Grandmother      Heart attack Paternal Grandmother         Physical Exam:  Vital signs:   Vitals:    06/25/24 1056   BP: 122/77   BP Location: Left arm   Patient Position: Sitting   BP Method: Medium (Automatic)   Pulse: 88   Resp: 20   Temp: 97.3 °F (36.3 °C)   TempSrc: Oral   SpO2: 99%   Weight: 73.9 kg (162 lb 14.7 oz)   Height: 5' 3" (1.6 m)   General:  Well-developed well-nourished female in no acute distress.  Voice is normal.  Head and face:  Normocephalic.  No facial lesions.  No temporomandibular joint tenderness or click.  Ears:  Right ear-auricle is normally developed.  External auditory canal is clear.  Tympanic membrane is nonerythematous.  No middle ear effusion.  Left ear-auricle is normally developed.  External auditory canal is clear.  Tympanic membrane " is nonerythematous.  No middle ear effusion.  Nose: Nasal dorsum unremarkable.  She does have left septal deviation.  No congestion or abnormal drainage.    Oral cavity and oropharynx:  Tongue and floor mouth are without lesions.  Mucosa is moist.  No pharyngeal erythema or exudates.  No oropharyngeal masses.  No tonsillar hypertrophy.  Neck:  Supple without palpable adenopathy or thyromegaly.  Trachea in the midline.  Parotid and submandibular glands are normal to palpation.   1.5 cm incision c/d/I without fluctuance, edema, or drainage.   Eyes:  Extraocular muscles intact.  No nystagmus.  No exophthalmos or enophthalmos.  Neurologic:  Alert and oriented.  Cranial nerves 2-12 are grossly normal.      Pathology: 6/19/2024  Final Diagnosis      Skin lesion, anterior neck, excisional biopsy:      - Epidermal inclusion cyst.         Assessment/Plan:  Probable epidermal cyst of the right neck s/p excision on 6/19.   Doing well    Plan:   Continue Aquaphor  Start gentle massages next week.     Truong Abrams MD  [facility], PGY-3

## 2024-10-02 ENCOUNTER — LAB VISIT (OUTPATIENT)
Dept: LAB | Facility: HOSPITAL | Age: 59
End: 2024-10-02
Attending: FAMILY MEDICINE
Payer: MEDICARE

## 2024-10-02 DIAGNOSIS — I10 ESSENTIAL HYPERTENSION, MALIGNANT: ICD-10-CM

## 2024-10-02 DIAGNOSIS — R73.9 BLOOD GLUCOSE ELEVATED: ICD-10-CM

## 2024-10-02 DIAGNOSIS — E78.5 HYPERLIPIDEMIA, UNSPECIFIED HYPERLIPIDEMIA TYPE: ICD-10-CM

## 2024-10-02 DIAGNOSIS — F39 MILD MOOD DISORDER: ICD-10-CM

## 2024-10-02 DIAGNOSIS — N95.9 MENOPAUSAL PROBLEM: ICD-10-CM

## 2024-10-02 DIAGNOSIS — M25.572 LEFT ANKLE PAIN: ICD-10-CM

## 2024-10-02 DIAGNOSIS — G62.9 PERIPHERAL NERVE DISORDER: ICD-10-CM

## 2024-10-02 DIAGNOSIS — K63.5 HYPERPLASTIC POLYP OF INTESTINE: ICD-10-CM

## 2024-10-02 DIAGNOSIS — L72.9 SKIN CYST: ICD-10-CM

## 2024-10-02 DIAGNOSIS — F41.9 ANXIETY DISORDER OF CHILDHOOD OR ADOLESCENCE: Primary | ICD-10-CM

## 2024-10-02 LAB
ALBUMIN SERPL-MCNC: 3.5 G/DL (ref 3.4–4.8)
ALBUMIN/GLOB SERPL: 1 RATIO (ref 1.1–2)
ALP SERPL-CCNC: 134 UNIT/L (ref 40–150)
ALT SERPL-CCNC: 8 UNIT/L (ref 0–55)
ANION GAP SERPL CALC-SCNC: 8 MEQ/L
AST SERPL-CCNC: 11 UNIT/L (ref 5–34)
BACTERIA #/AREA URNS AUTO: NORMAL /HPF
BILIRUB SERPL-MCNC: 0.2 MG/DL
BILIRUB UR QL STRIP.AUTO: NEGATIVE
BUN SERPL-MCNC: 7.2 MG/DL (ref 9.8–20.1)
CALCIUM SERPL-MCNC: 9.5 MG/DL (ref 8.4–10.2)
CHLORIDE SERPL-SCNC: 105 MMOL/L (ref 98–107)
CHOLEST SERPL-MCNC: 197 MG/DL
CHOLEST/HDLC SERPL: 4 {RATIO} (ref 0–5)
CLARITY UR: CLEAR
CO2 SERPL-SCNC: 27 MMOL/L (ref 23–31)
COLOR UR AUTO: YELLOW
CREAT SERPL-MCNC: 0.76 MG/DL (ref 0.55–1.02)
CREAT/UREA NIT SERPL: 9
ERYTHROCYTE [DISTWIDTH] IN BLOOD BY AUTOMATED COUNT: 14.9 % (ref 11.5–17)
GFR SERPLBLD CREATININE-BSD FMLA CKD-EPI: >60 ML/MIN/1.73/M2
GLOBULIN SER-MCNC: 3.4 GM/DL (ref 2.4–3.5)
GLUCOSE SERPL-MCNC: 127 MG/DL (ref 82–115)
GLUCOSE UR QL STRIP: NEGATIVE
HCT VFR BLD AUTO: 40.8 % (ref 37–47)
HDLC SERPL-MCNC: 44 MG/DL (ref 35–60)
HGB BLD-MCNC: 13.2 G/DL (ref 12–16)
HGB UR QL STRIP: NEGATIVE
KETONES UR QL STRIP: NEGATIVE
LDLC SERPL CALC-MCNC: 136 MG/DL (ref 50–140)
LEUKOCYTE ESTERASE UR QL STRIP: NEGATIVE
MCH RBC QN AUTO: 27.7 PG (ref 27–31)
MCHC RBC AUTO-ENTMCNC: 32.4 G/DL (ref 33–36)
MCV RBC AUTO: 85.5 FL (ref 80–94)
NITRITE UR QL STRIP: NEGATIVE
PH UR STRIP: 6 [PH]
PLATELET # BLD AUTO: 304 X10(3)/MCL (ref 130–400)
PMV BLD AUTO: 11.4 FL (ref 7.4–10.4)
POTASSIUM SERPL-SCNC: 3.8 MMOL/L (ref 3.5–5.1)
PROT SERPL-MCNC: 6.9 GM/DL (ref 5.8–7.6)
PROT UR QL STRIP: NEGATIVE
RBC # BLD AUTO: 4.77 X10(6)/MCL (ref 4.2–5.4)
RBC #/AREA URNS AUTO: NORMAL /HPF
SODIUM SERPL-SCNC: 140 MMOL/L (ref 136–145)
SP GR UR STRIP.AUTO: 1.01 (ref 1–1.03)
SQUAMOUS #/AREA URNS AUTO: NORMAL /HPF
TRIGL SERPL-MCNC: 84 MG/DL (ref 37–140)
TSH SERPL-ACNC: 0.49 UIU/ML (ref 0.35–4.94)
UROBILINOGEN UR STRIP-ACNC: 1
VLDLC SERPL CALC-MCNC: 17 MG/DL
WBC # BLD AUTO: 5.41 X10(3)/MCL (ref 4.5–11.5)
WBC #/AREA URNS AUTO: NORMAL /HPF

## 2024-10-02 PROCEDURE — 36415 COLL VENOUS BLD VENIPUNCTURE: CPT

## 2024-10-02 PROCEDURE — 81003 URINALYSIS AUTO W/O SCOPE: CPT

## 2024-10-02 PROCEDURE — 81001 URINALYSIS AUTO W/SCOPE: CPT

## 2024-10-02 PROCEDURE — 84443 ASSAY THYROID STIM HORMONE: CPT

## 2024-10-02 PROCEDURE — 83036 HEMOGLOBIN GLYCOSYLATED A1C: CPT

## 2024-10-02 PROCEDURE — 80053 COMPREHEN METABOLIC PANEL: CPT

## 2024-10-02 PROCEDURE — 85027 COMPLETE CBC AUTOMATED: CPT

## 2024-10-02 PROCEDURE — 80061 LIPID PANEL: CPT

## 2024-10-04 LAB
EST. AVERAGE GLUCOSE BLD GHB EST-MCNC: 116.9 MG/DL
HBA1C MFR BLD: 5.7 %

## 2025-04-01 DIAGNOSIS — Z12.31 OTHER SCREENING MAMMOGRAM: ICD-10-CM

## 2025-04-01 DIAGNOSIS — Z78.0 MENOPAUSE PRESENT: Primary | ICD-10-CM

## 2025-04-23 ENCOUNTER — HOSPITAL ENCOUNTER (OUTPATIENT)
Dept: RADIOLOGY | Facility: HOSPITAL | Age: 60
Discharge: HOME | End: 2025-04-23
Attending: FAMILY MEDICINE
Payer: COMMERCIAL

## 2025-04-23 DIAGNOSIS — Z78.0 MENOPAUSE PRESENT: ICD-10-CM

## 2025-04-23 DIAGNOSIS — Z12.31 OTHER SCREENING MAMMOGRAM: ICD-10-CM

## 2025-04-23 PROCEDURE — 77063 BREAST TOMOSYNTHESIS BI: CPT | Mod: TC

## 2025-04-23 PROCEDURE — 77080 DXA BONE DENSITY AXIAL: CPT | Mod: TC
